# Patient Record
Sex: MALE | URBAN - METROPOLITAN AREA
[De-identification: names, ages, dates, MRNs, and addresses within clinical notes are randomized per-mention and may not be internally consistent; named-entity substitution may affect disease eponyms.]

---

## 2017-12-06 ENCOUNTER — OFFICE VISIT (OUTPATIENT)
Dept: ORTHOPEDICS | Facility: CLINIC | Age: 37
End: 2017-12-06

## 2017-12-06 VITALS — BODY MASS INDEX: 28.53 KG/M2 | HEIGHT: 76 IN | WEIGHT: 234.3 LBS

## 2017-12-06 DIAGNOSIS — M20.21 HALLUX RIGIDUS OF RIGHT FOOT: ICD-10-CM

## 2017-12-06 DIAGNOSIS — M25.572 PAIN IN JOINTS OF BOTH FEET: Primary | ICD-10-CM

## 2017-12-06 DIAGNOSIS — M25.571 PAIN IN JOINTS OF BOTH FEET: Primary | ICD-10-CM

## 2017-12-06 DIAGNOSIS — M76.72 PERONEAL TENDONITIS OF LEFT LOWER EXTREMITY: ICD-10-CM

## 2017-12-06 RX ORDER — DEXTROAMPHETAMINE SACCHARATE, AMPHETAMINE ASPARTATE, DEXTROAMPHETAMINE SULFATE AND AMPHETAMINE SULFATE 5; 5; 5; 5 MG/1; MG/1; MG/1; MG/1
20 TABLET ORAL DAILY
COMMUNITY
End: 2020-07-30

## 2017-12-06 NOTE — LETTER
12/6/2017       RE: Maico Shen  [address]  [facility] [address]     Dear Colleague,    Thank you for referring your patient, Maico Shen, to the [facility] at [facility]. Please see a copy of my visit note below.         NEW PATIENT INTAKE QUESTIONNAIRE  SPORTS & ORTHOPEDIC WALK-IN 12/6/2017    Primary Care Physician: Unsure his name    Where are the majority of your medical records? [facility]    Pt states he has chronic flare ups during long distance running or walking. Right foot toe pain and left ankle pain. Baseline pain of about 1/10, flare ups increase pain to various levels. Brace helps with the ankle pain.     Reason for Visit:    What part of your body is injured / painful?  Right big toe, left ankle    What caused the injury /pain? Overuse injury from regular activity    How long ago did your injury occur or pain begin? Several years ago    What are your most bothersome symptoms? Pain and Other: swelling or calcification of PIP joint of big toe, firm to touch.     How would you characterize your symptom? unchanged    What makes your symptoms better? Rest    What makes your symptoms worse? Other: long distance running and walking    Have you been previously seen for this problem? No    Medical History:    Medical History: mild ankle sprain 2.5 years ago    Have you had surgery on this body part before? No    Medications: adderall    Allergies: No known drug allergies    Family History of Medical Problems: osteoporosis (mother), cardiovascular issues    Previous Surgeries: None    Social History:    Occupation: Grad student     Handedness: Right    Exercise: lifting, Cardiovascular: swimming, cycling, Running Days per week: 3-4 depending on season    Review of Systems:    Have you recently had a a fever, chills, weight loss? No    Do you have any vision problems? No    Do you have any chest pain or edema? No    Do  you have any shortness of breath or wheezing?  No    Do you have stomach problems? No    Do you have any numbness or focal weakness? No    Do you have diabetes? No    Do you have problems with bleeding or clotting? No    Do you have an rashes or other skin lesions? No    Communication:    How did you hear about us? Mailing: Postcard    Who else should know about this visit? None            CHIEF COMPLAINT:  New Patient (Bilateral foot pain)       HISTORY OF PRESENT ILLNESS  Mr. Shen is a pleasant 37 year old year old male who presents to clinic today with bilateral pain of feet.  Maico explains that left lateral ankle with prolonged runs 6-7 miles.  Patient states this pain radiates to base of 5th MT.  Recalls mild ankle sprain about 2 years ago but minimal swelling and did not seek medical care.  Pain is a 1/10.  Wearing ankle brace during long runs.   No hx of formal PT.      Additional pain right great toe over the past 2 years. Stiffness at times. Pain with long distance running.  Improved with rest.  1/10 pain with intermittent flares with long distances.  No hx of inciting event, toe sprain, or injury.      Running 3-4 days a week.  Short-long runs.  No orthotics.  Long runs in stability shoes, [employer]ics.  Short runs in minimalist shoe.     Additional history: as documented    MEDICAL HISTORY      Medical History: mild ankle sprain 2.5 years ago    Have you had surgery on this body part before? No    Medications: adderall    Allergies: No known drug allergies    Family History of Medical Problems: osteoporosis (mother), cardiovascular issues    Previous Surgeries: None     Social History:    Occupation: Grad student     Handedness: Right    Exercise: lifting, Cardiovascular: swimming, cycling, Running Days per week: 3-4 depending on season    Additional medical/Social/Surgical histories reviewed in [facility] and updated as appropriate.     REVIEW OF SYSTEMS (12/6/2017)  CONSTITUTIONAL: Denies fever and weight  "loss  EYES: Denies acute vision changes  ENT: Denies hearing changes or difficulty swallowing  CARDIAC: Denies chest pain or edema  RESPIRATORY: Denies dyspnea, cough or wheeze  GASTROINTESTINAL: Denies abdominal pain, nausea, vomiting  MUSCULOSKELETAL: See HPI  SKIN: Denies any recent rash or lesion  NEUROLOGICAL: Denies numbness or focal weakness  PSYCHIATRIC: No history of psychiatric symptoms or problems  ENDOCRINE: Denies current diagnosis of diabetes  HEMATOLOGY: Denies episodes of easy bleeding      PHYSICAL EXAM  Ht 1.93 m (6' 4\")  Wt 106.3 kg (234 lb 4.8 oz)  BMI 28.52 kg/m2    General Appearance: Well appearing, alert, in no acute distress, well-hydrated, and well nourished  Cardiovascular: no signs of upper or lower extremity edema  Respiratory: no respiratory distress, no audible wheezing, no labored breathing, symmetric thoracic excursion  Psychiatric: mood and affect are appropriate, patient is oriented to time, place and person  Musculoskeletal - Bilateral feet  - stance: normal gait without limp, normal stance without excessive pronation, normal heel inversion with standing heel raise, no obvious leg length discrepancy, normal heel and toe walk. Preserved arches.  - inspection: no swelling or effusion,  normal bone and joint alignment, no obvious deformity  - palpation: Bony nodule at 1st MTP joint of right foot, however nontender at joint. Left ankle with mild tenderness of peroneal tendon.   - ROM: normal active and passive ROM of great and lesser toes of left foot. Decreased ROM of 1st MTP on right foot.  No pain with MT translation bilaterally.    - strength: 5/5 in all planes  Neuro  - no sensory or motor deficit, grossly normal coordination, normal muscle tone  Skin  - no ecchymosis, erythema, warmth, or induration, no obvious rash  Psych  - interactive, appropriate, normal mood and affect      IMAGING : XR bilateral feet. Final results and radiologist's interpretation, available in the Epic " health record. Images were reviewed with the patient/family members in the office today. My personal interpretation of the performed imaging is left foot normal; no stress rxn of 5th MT.  Right 1st MTP osteoarthritis.      ASSESSMENT & PLAN  Mr. Shen is a 37 year old year old male who presents to clinic today with left ankle and right great toe pain that is exacerbated by long distance runs.  Discussed treatment options for great toe including conservative measures, injection.  Would like to proceed with conservative therapies at this time sans injection.    Diagnosis:   Peroneal tendonitis of left foot  Hallux rigidus of right foot    -voltaren gel to peroneal tendon, 1st MTP joint  -Peroneal tendon HEP provided  -Orthotic inserts  -Avoid minimalist shoe wear  -Hendrix extension for right foot  -Follow up: PRN consider injection of 1st MTP    It was a pleasure seeing Maico today.    Imtiaz Robles DO, CAM  Primary Care Sports Medicine      Again, thank you for allowing me to participate in the care of your patient.      Sincerely,    Imtiaz Robles DO

## 2017-12-06 NOTE — PATIENT INSTRUCTIONS
1. Voltaren gel to affected to toe and peroneal tendon  2. Peroneal tendon exercises (below)  3. Hendrix Extension for creating more rigid shoe, may tape  4. Avoid minimalist shoes; stability shoe with inserts  5. Follow up as needed; consider future injection in toe joint    - wear at base of shoe under cushion      Peroneal Tendon Exercises  You may start these exercises when you can stand comfortably on your injured leg with your heel resting on the floor and your full weight evenly distributed on both legs.  Towel stretch: Sit on a hard surface with your injured leg stretched out in front of you. Loop a towel around your toes and the ball of your foot and pull the towel toward your body keeping your leg straight. Hold this position for 15 to 30 seconds and then relax. Repeat 3 times.  When you don't feel much of a stretch using the towel, you can start the following exercises.  Standing calf stretch: Stand facing a wall with your hands on the wall at about eye level. Keep your injured leg back with your heel on the floor. Keep the other leg forward with the knee bent. Turn your back foot slightly inward (as if you were pigeon-toed). Slowly lean into the wall until you feel a stretch in the back of your calf. Hold the stretch for 15 to 30 seconds. Return to the starting position. Repeat 3 times. Do this exercise several times each day.   Standing soleus stretch: Stand facing a wall with your hands on the wall at about chest height. Keep your injured leg back with your heel on the floor. Keep the other leg forward with the knee bent. Turn your back foot slightly inward (as if you were pigeon-toed). Bend your back knee slightly and gently lean into the wall until you feel a stretch in the lower calf of your injured leg. Hold the stretch for 15 to 30 seconds. Return to the starting position. Repeat 3 times.   Achilles stretch: Stand with the ball of one foot on a stair. Reach for the step below with your heel until  you feel a stretch in the arch of your foot. Hold this position for 15 to 30 seconds and then relax. Repeat 3 times.   Heel raise: Stand behind a chair or counter with both feet flat on the floor. Using the chair or counter as a support, rise up onto your toes and hold for 5 seconds. Then slowly lower yourself down without holding onto the support. (It's OK to keep holding onto the support if you need to.) When this exercise becomes less painful, try doing this exercise while you are standing on the injured leg only. Repeat 15 times. Do 2 sets of 15. Rest 30 seconds between sets.   Step-up: Stand with the foot of your injured leg on a support 3 to 5 inches (8 to 13 centimeters) high --like a small step or block of wood. Keep your other foot flat on the floor. Shift your weight onto the injured leg on the support. Straighten your injured leg as the other leg comes off the floor. Return to the starting position by bending your injured leg and slowly lowering your uninjured leg back to the floor. Do 2 sets of 15.   Resisted ankle eversion: Sit with both legs stretched out in front of you, with your feet about a shoulder's width apart. Tie a loop in one end of elastic tubing. Put the foot of your injured leg through the loop so that the tubing goes around the arch of that foot and wraps around the outside of the other foot. Hold onto the other end of the tubing with your hand to provide tension. Turn the foot of your injured leg up and out. Make sure you keep your other foot still so that it will allow the tubing to stretch as you move the foot of your injured leg. Return to the starting position. Do 2 sets of 15.   Balance and reach exercises: Stand next to a chair with your injured leg farther from the chair. The chair will provide support if you need it. Stand on the foot of your injured leg and bend your knee slightly. Try to raise the arch of this foot while keeping your big toe on the floor. Keep your foot in this  position.   With the hand that is farther away from the chair, reach forward in front of you by bending at the waist. Avoid bending your knee any more as you do this. Repeat this 15 times. To make the exercise more challenging, reach farther in front of you. Do 2 sets of 15.   While keeping your arch raised, reach the hand that is farther away from the chair across your body toward the chair. The farther you reach, the more challenging the exercise. Do 2 sets of 15.  If you have access to a wobble board, do the following exercises:  Wobble board exercises   Stand on a wobble board with your feet shoulder-width apart.   Rock the board forwards and backwards 30 times, then side to side 30 times. Hold on to a chair if you need support.   Rotate the wobble board around so that the edge of the board is in contact with the floor at all times. Do this 30 times in a clockwise and then a counterclockwise direction.   Balance on the wobble board for as long as you can without letting the edges touch the floor. Try to do this for 2 minutes without touching the floor.   Rotate the wobble board in clockwise and counterclockwise circles, but do not let the edge of the board touch the floor.   When you have mastered the wobble exercises standing on both legs, try repeating them while standing on just your injured leg. After you are able to do these exercises on one leg, try to do them with your eyes closed. Make sure you have something nearby to support you in case you lose your balance.  Developed by [employer].  Published by [employer].  Copyright  2014 [employer] and/or one of its subsidiaries. All rights reserved.

## 2017-12-06 NOTE — PROGRESS NOTES
NEW PATIENT INTAKE QUESTIONNAIRE  SPORTS & ORTHOPEDIC WALK-IN 12/6/2017    Primary Care Physician: Unsure his name    Where are the majority of your medical records? [facility]    Pt states he has chronic flare ups during long distance running or walking. Right foot toe pain and left ankle pain. Baseline pain of about 1/10, flare ups increase pain to various levels. Brace helps with the ankle pain.     Reason for Visit:    What part of your body is injured / painful?  Right big toe, left ankle    What caused the injury /pain? Overuse injury from regular activity    How long ago did your injury occur or pain begin? Several years ago    What are your most bothersome symptoms? Pain and Other: swelling or calcification of PIP joint of big toe, firm to touch.     How would you characterize your symptom? unchanged    What makes your symptoms better? Rest    What makes your symptoms worse? Other: long distance running and walking    Have you been previously seen for this problem? No    Medical History:    Medical History: mild ankle sprain 2.5 years ago    Have you had surgery on this body part before? No    Medications: adderall    Allergies: No known drug allergies    Family History of Medical Problems: osteoporosis (mother), cardiovascular issues    Previous Surgeries: None    Social History:    Occupation: Grad student     Handedness: Right    Exercise: lifting, Cardiovascular: swimming, cycling, Running Days per week: 3-4 depending on season    Review of Systems:    Have you recently had a a fever, chills, weight loss? No    Do you have any vision problems? No    Do you have any chest pain or edema? No    Do you have any shortness of breath or wheezing?  No    Do you have stomach problems? No    Do you have any numbness or focal weakness? No    Do you have diabetes? No    Do you have problems with bleeding or clotting? No    Do you have an rashes or other skin lesions? No    Communication:    How did you hear about  us? Mailing: Postcard    Who else should know about this visit? None

## 2017-12-06 NOTE — LETTER
Date:December 7, 2017      Patient was self referred, no letter generated. Do not send.        [facility] Health Information

## 2017-12-06 NOTE — MR AVS SNAPSHOT
After Visit Summary   12/6/2017    Maico Shen    MRN: [id number]           Patient Information     Date Of Birth          1980        Visit Information        Provider Department      12/6/2017 9:50 AM Imtiaz Robles DO [facility]        Today's Diagnoses     Pain in joints of both feet    -  1      Care Instructions    1. Voltaren gel to affected to toe and peroneal tendon  2. Peroneal tendon exercises (below)  3. Hendrix Extension for creating more rigid shoe, may tape  4. Avoid minimalist shoes; stability shoe with inserts  5. Follow up as needed; consider future injection in toe joint    - wear at base of shoe under cushion      Peroneal Tendon Exercises  You may start these exercises when you can stand comfortably on your injured leg with your heel resting on the floor and your full weight evenly distributed on both legs.  Towel stretch: Sit on a hard surface with your injured leg stretched out in front of you. Loop a towel around your toes and the ball of your foot and pull the towel toward your body keeping your leg straight. Hold this position for 15 to 30 seconds and then relax. Repeat 3 times.  When you don't feel much of a stretch using the towel, you can start the following exercises.  Standing calf stretch: Stand facing a wall with your hands on the wall at about eye level. Keep your injured leg back with your heel on the floor. Keep the other leg forward with the knee bent. Turn your back foot slightly inward (as if you were pigeon-toed). Slowly lean into the wall until you feel a stretch in the back of your calf. Hold the stretch for 15 to 30 seconds. Return to the starting position. Repeat 3 times. Do this exercise several times each day.   Standing soleus stretch: Stand facing a wall with your hands on the wall at about chest height. Keep your injured leg back with your heel on the floor. Keep the other leg forward with the knee bent. Turn  your back foot slightly inward (as if you were pigeon-toed). Bend your back knee slightly and gently lean into the wall until you feel a stretch in the lower calf of your injured leg. Hold the stretch for 15 to 30 seconds. Return to the starting position. Repeat 3 times.   Achilles stretch: Stand with the ball of one foot on a stair. Reach for the step below with your heel until you feel a stretch in the arch of your foot. Hold this position for 15 to 30 seconds and then relax. Repeat 3 times.   Heel raise: Stand behind a chair or counter with both feet flat on the floor. Using the chair or counter as a support, rise up onto your toes and hold for 5 seconds. Then slowly lower yourself down without holding onto the support. (It's OK to keep holding onto the support if you need to.) When this exercise becomes less painful, try doing this exercise while you are standing on the injured leg only. Repeat 15 times. Do 2 sets of 15. Rest 30 seconds between sets.   Step-up: Stand with the foot of your injured leg on a support 3 to 5 inches (8 to 13 centimeters) high --like a small step or block of wood. Keep your other foot flat on the floor. Shift your weight onto the injured leg on the support. Straighten your injured leg as the other leg comes off the floor. Return to the starting position by bending your injured leg and slowly lowering your uninjured leg back to the floor. Do 2 sets of 15.   Resisted ankle eversion: Sit with both legs stretched out in front of you, with your feet about a shoulder's width apart. Tie a loop in one end of elastic tubing. Put the foot of your injured leg through the loop so that the tubing goes around the arch of that foot and wraps around the outside of the other foot. Hold onto the other end of the tubing with your hand to provide tension. Turn the foot of your injured leg up and out. Make sure you keep your other foot still so that it will allow the tubing to stretch as you move the foot  of your injured leg. Return to the starting position. Do 2 sets of 15.   Balance and reach exercises: Stand next to a chair with your injured leg farther from the chair. The chair will provide support if you need it. Stand on the foot of your injured leg and bend your knee slightly. Try to raise the arch of this foot while keeping your big toe on the floor. Keep your foot in this position.   With the hand that is farther away from the chair, reach forward in front of you by bending at the waist. Avoid bending your knee any more as you do this. Repeat this 15 times. To make the exercise more challenging, reach farther in front of you. Do 2 sets of 15.   While keeping your arch raised, reach the hand that is farther away from the chair across your body toward the chair. The farther you reach, the more challenging the exercise. Do 2 sets of 15.  If you have access to a wobble board, do the following exercises:  Wobble board exercises   Stand on a wobble board with your feet shoulder-width apart.   Rock the board forwards and backwards 30 times, then side to side 30 times. Hold on to a chair if you need support.   Rotate the wobble board around so that the edge of the board is in contact with the floor at all times. Do this 30 times in a clockwise and then a counterclockwise direction.   Balance on the wobble board for as long as you can without letting the edges touch the floor. Try to do this for 2 minutes without touching the floor.   Rotate the wobble board in clockwise and counterclockwise circles, but do not let the edge of the board touch the floor.   When you have mastered the wobble exercises standing on both legs, try repeating them while standing on just your injured leg. After you are able to do these exercises on one leg, try to do them with your eyes closed. Make sure you have something nearby to support you in case you lose your balance.  Developed by [employer].  Published by [employer].  Copyright  " 2014 [employer] and/or one of its subsidiaries. All rights reserved.                Follow-ups after your visit        Who to contact     Please call your clinic at [phone] to:    Ask questions about your health    Make or cancel appointments    Discuss your medicines    Learn about your test results    Speak to your doctor   If you have compliments or concerns about an experience at your clinic, or if you wish to file a complaint, please contact [facility] Patient Relations at [phone] or email us at [email or web address]         Additional Information About Your Visit        [employer] Information     WOMN is an electronic gateway that provides easy, online access to your medical records. With WOMN, you can request a clinic appointment, read your test results, renew a prescription or communicate with your care team.     To sign up for WOMN visit the website at [email or web address]   You will be asked to enter the access code listed below, as well as some personal information. Please follow the directions to create your username and password.     Your access code is: [email or web address]  Expires: 3/6/2018 10:40 AM     Your access code will  in 90 days. If you need help or a new code, please contact your [facility] or call [phone] for assistance.        Care EveryWhere ID     This is your Care EveryWhere ID. This could be used by other organizations to access your [facility] medical records  [id number]        Your Vitals Were     Height BMI (Body Mass Index)                1.93 m (6' 4\") 28.52 kg/m2           Blood Pressure from Last 3 Encounters:   No data found for BP    Weight from Last 3 Encounters:   17 106.3 kg (234 lb 4.8 oz)               Primary Care Provider    None Specified       No primary provider on file.        Equal Access to Services     TAMARA GILLILAND AH: radha Ramsay " crispin ruizchrisbarbara espinozacassie moore. So [facility] [phone].    ATENCIÓN: Si angélica weller, tiene a miller disposición servicios gratuitos de asistencia lingüística. Llame al [phone].    We comply with applicable federal civil rights laws and [place] laws. We do not discriminate on the basis of race, color, national origin, age, disability, sex, sexual orientation, or gender identity.            Thank you!     Thank you for choosing [facility] CLIN[facility]  for your care. Our goal is always to provide you with excellent care. Hearing back from our patients is one way we can continue to improve our services. Please take a few minutes to complete the written survey that you may receive in the mail after your visit with us. Thank you!             Your Updated Medication List - Protect others around you: Learn how to safely use, store and throw away your medicines at www.disposemymeds.org.          This list is accurate as of: 12/6/17 10:53 AM.  Always use your most recent med list.                   Brand Name Dispense Instructions for use Diagnosis    ADDERALL 20 MG per tablet   Generic drug:  amphetamine-dextroamphetamine      Take 20 mg by mouth daily

## 2017-12-06 NOTE — PROGRESS NOTES
CHIEF COMPLAINT:  New Patient (Bilateral foot pain)       HISTORY OF PRESENT ILLNESS  Mr. Shen is a pleasant 37 year old year old male who presents to clinic today with bilateral pain of feet.  Maico explains that left lateral ankle with prolonged runs 6-7 miles.  Patient states this pain radiates to base of 5th MT.  Recalls mild ankle sprain about 2 years ago but minimal swelling and did not seek medical care.  Pain is a 1/10.  Wearing ankle brace during long runs.   No hx of formal PT.      Additional pain right great toe over the past 2 years. Stiffness at times. Pain with long distance running.  Improved with rest.  1/10 pain with intermittent flares with long distances.  No hx of inciting event, toe sprain, or injury.      Running 3-4 days a week.  Short-long runs.  No orthotics.  Long runs in stability shoes, [employer]ics.  Short runs in minimalist shoe.     Additional history: as documented    MEDICAL HISTORY      Medical History: mild ankle sprain 2.5 years ago    Have you had surgery on this body part before? No    Medications: adderall    Allergies: No known drug allergies    Family History of Medical Problems: osteoporosis (mother), cardiovascular issues    Previous Surgeries: None     Social History:    Occupation: Grad student     Handedness: Right    Exercise: lifting, Cardiovascular: swimming, cycling, Running Days per week: 3-4 depending on season    Additional medical/Social/Surgical histories reviewed in [facility] and updated as appropriate.     REVIEW OF SYSTEMS (12/6/2017)  CONSTITUTIONAL: Denies fever and weight loss  EYES: Denies acute vision changes  ENT: Denies hearing changes or difficulty swallowing  CARDIAC: Denies chest pain or edema  RESPIRATORY: Denies dyspnea, cough or wheeze  GASTROINTESTINAL: Denies abdominal pain, nausea, vomiting  MUSCULOSKELETAL: See HPI  SKIN: Denies any recent rash or lesion  NEUROLOGICAL: Denies numbness or focal weakness  PSYCHIATRIC: No history of psychiatric  "symptoms or problems  ENDOCRINE: Denies current diagnosis of diabetes  HEMATOLOGY: Denies episodes of easy bleeding      PHYSICAL EXAM  Ht 1.93 m (6' 4\")  Wt 106.3 kg (234 lb 4.8 oz)  BMI 28.52 kg/m2    General Appearance: Well appearing, alert, in no acute distress, well-hydrated, and well nourished  Cardiovascular: no signs of upper or lower extremity edema  Respiratory: no respiratory distress, no audible wheezing, no labored breathing, symmetric thoracic excursion  Psychiatric: mood and affect are appropriate, patient is oriented to time, place and person  Musculoskeletal - Bilateral feet  - stance: normal gait without limp, normal stance without excessive pronation, normal heel inversion with standing heel raise, no obvious leg length discrepancy, normal heel and toe walk. Preserved arches.  - inspection: no swelling or effusion,  normal bone and joint alignment, no obvious deformity  - palpation: Bony nodule at 1st MTP joint of right foot, however nontender at joint. Left ankle with mild tenderness of peroneal tendon.   - ROM: normal active and passive ROM of great and lesser toes of left foot. Decreased ROM of 1st MTP on right foot.  No pain with MT translation bilaterally.    - strength: 5/5 in all planes  Neuro  - no sensory or motor deficit, grossly normal coordination, normal muscle tone  Skin  - no ecchymosis, erythema, warmth, or induration, no obvious rash  Psych  - interactive, appropriate, normal mood and affect      IMAGING : XR bilateral feet. Final results and radiologist's interpretation, available in the [facility] health record. Images were reviewed with the patient/family members in the office today. My personal interpretation of the performed imaging is left foot normal; no stress rxn of 5th MT.  Right 1st MTP osteoarthritis.      ASSESSMENT & PLAN  Mr. Shen is a 37 year old year old male who presents to clinic today with left ankle and right great toe pain that is exacerbated by long " distance runs.  Discussed treatment options for great toe including conservative measures, injection.  Would like to proceed with conservative therapies at this time sans injection.    Diagnosis:   Peroneal tendonitis of left foot  Hallux rigidus of right foot    -voltaren gel to peroneal tendon, 1st MTP joint  -Peroneal tendon HEP provided  -Orthotic inserts  -Avoid minimalist shoe wear  -Hendrix extension for right foot  -Follow up: PRN consider injection of 1st MTP    It was a pleasure seeing Maico today.    Imtiaz Robles DO, CAQSM  Primary Care Sports Medicine

## 2018-04-25 ENCOUNTER — OFFICE VISIT (OUTPATIENT)
Dept: URGENT CARE | Facility: URGENT CARE | Age: 38
End: 2018-04-25
Payer: COMMERCIAL

## 2018-04-25 VITALS
DIASTOLIC BLOOD PRESSURE: 79 MMHG | WEIGHT: 231.31 LBS | SYSTOLIC BLOOD PRESSURE: 139 MMHG | TEMPERATURE: 98.2 F | BODY MASS INDEX: 28.16 KG/M2 | HEART RATE: 71 BPM

## 2018-04-25 DIAGNOSIS — S99.922A FOOT INJURY, LEFT, INITIAL ENCOUNTER: Primary | ICD-10-CM

## 2018-04-25 PROCEDURE — 99203 OFFICE O/P NEW LOW 30 MIN: CPT | Performed by: PHYSICIAN ASSISTANT

## 2018-04-25 NOTE — MR AVS SNAPSHOT
"              After Visit Summary   4/25/2018    Maico Shen    MRN: [id number]           Patient Information     Date Of Birth          1980        Visit Information        Provider Department      4/25/2018 4:50 PM Es Thompson PA-C [facility]        Today's Diagnoses     Foot injury, left, initial encounter    -  1      Care Instructions    (S99.922A) Foot injury, left, initial encounter  (primary encounter diagnosis)  Comment: possible stress fracture versus tendon injury  Plan: order for DME, diclofenac (VOLTAREN) 1 % GEL         topical gel          Ice to area as needed over thin cloth    REST.  No running or weight bearing training until follow up with sports med clinic.                  Follow-ups after your visit        Who to contact     If you have questions or need follow up information about today's clinic visit or your schedule please contact [facility] directly at [phone].  Normal or non-critical lab and imaging results will be communicated to you by MyChart, letter or phone within 4 business days after the clinic has received the results. If you do not hear from us within 7 days, please contact the clinic through [employer] or phone. If you have a critical or abnormal lab result, we will notify you by phone as soon as possible.  Submit refill requests through [employer] or call your pharmacy and they will forward the refill request to us. Please allow 3 business days for your refill to be completed.          Additional Information About Your Visit        [employer] Information     [employer] lets you send messages to your doctor, view your test results, renew your prescriptions, schedule appointments and more. To sign up, go to [email or web address] . Click on \"Log in\" on the left side of the screen, which will take you to the Welcome page. Then click on \"Sign up Now\" on the right side of the page.     You will be asked to " enter the access code listed below, as well as some personal information. Please follow the directions to create your username and password.     Your access code is: [email or web address]  Expires: 2018  5:44 PM     Your access code will  in 90 days. If you need help or a new code, please call your [place] clinic or [phone].        Care EveryWhere ID     This is your Care EveryWhere ID. This could be used by other organizations to access your [place] medical records  [id number]        Your Vitals Were     Pulse Temperature BMI (Body Mass Index)             71 98.2  F (36.8  C) (Oral) 28.16 kg/m2          Blood Pressure from Last 3 Encounters:   18 139/79    Weight from Last 3 Encounters:   18 231 lb 5 oz (104.9 kg)   17 234 lb 4.8 oz (106.3 kg)              Today, you had the following     No orders found for display         Today's Medication Changes          These changes are accurate as of 18  5:44 PM.  If you have any questions, ask your nurse or doctor.               Start taking these medicines.        Dose/Directions    diclofenac 1 % Gel topical gel   Commonly known as:  VOLTAREN   Used for:  Foot injury, left, initial encounter   Started by:  Es Thompson PA-C        Apply 4 grams to knees or 2 grams to hands four times daily using enclosed dosing card.   Quantity:  100 g   Refills:  1       order for DME   Used for:  Foot injury, left, initial encounter   Started by:  Es Thompson PA-C        Equipment being ordered: short cam walker   Quantity:  1 Device   Refills:  0            Where to get your medicines      These medications were sent to [facility] [id number] [place] [address] AT [address] [address]     Phone:  [phone]     diclofenac 1 % Gel topical gel         Some of these will need a paper prescription and others can be bought over the counter.  Ask your  nurse if you have questions.     Bring a paper prescription for each of these medications     order for DME                Primary Care Provider Fax #    Physician No Ref-Primary [phone]       No address on file        Equal Access to Services     TAMARA [place] : Hadjeffery aad ku hadstacy Sibley, radha lushellie, crispin kathania espino, cassie daley laArjunlucinda mejia. So [facility] [phone].    ATENCIÓN: Si habla español, tiene a miller disposición servicios gratuitos de asistencia lingüística. Llame al [phone].    We comply with applicable federal civil rights laws and [place] laws. We do not discriminate on the basis of race, color, national origin, age, disability, sex, sexual orientation, or gender identity.            Thank you!     Thank you for choosing [facility]  for your care. Our goal is always to provide you with excellent care. Hearing back from our patients is one way we can continue to improve our services. Please take a few minutes to complete the written survey that you may receive in the mail after your visit with us. Thank you!             Your Updated Medication List - Protect others around you: Learn how to safely use, store and throw away your medicines at www.disposemymeds.org.          This list is accurate as of 4/25/18  5:44 PM.  Always use your most recent med list.                   Brand Name Dispense Instructions for use Diagnosis    ADDERALL 20 MG per tablet   Generic drug:  amphetamine-dextroamphetamine      Take 20 mg by mouth daily        diclofenac 1 % Gel topical gel    VOLTAREN    100 g    Apply 4 grams to knees or 2 grams to hands four times daily using enclosed dosing card.    Foot injury, left, initial encounter       order for DME     1 Device    Equipment being ordered: short cam walker    Foot injury, left, initial encounter

## 2018-04-25 NOTE — PATIENT INSTRUCTIONS
(F32.029F) Foot injury, left, initial encounter  (primary encounter diagnosis)  Comment: possible stress fracture versus tendon injury  Plan: order for DME, diclofenac (VOLTAREN) 1 % GEL         topical gel          Ice to area as needed over thin cloth    REST.  No running or weight bearing training until follow up with sports med clinic.

## 2019-04-24 ENCOUNTER — MEDICAL CORRESPONDENCE (OUTPATIENT)
Dept: HEALTH INFORMATION MANAGEMENT | Facility: CLINIC | Age: 39
End: 2019-04-24

## 2019-04-24 ENCOUNTER — TRANSFERRED RECORDS (OUTPATIENT)
Dept: HEALTH INFORMATION MANAGEMENT | Facility: CLINIC | Age: 39
End: 2019-04-24

## 2019-04-25 ENCOUNTER — OFFICE VISIT (OUTPATIENT)
Dept: ORTHOPEDICS | Facility: CLINIC | Age: 39
End: 2019-04-25
Payer: COMMERCIAL

## 2019-04-25 VITALS — SYSTOLIC BLOOD PRESSURE: 154 MMHG | DIASTOLIC BLOOD PRESSURE: 89 MMHG

## 2019-04-25 DIAGNOSIS — M76.61 ACHILLES TENDONITIS, BILATERAL: Primary | ICD-10-CM

## 2019-04-25 DIAGNOSIS — M76.62 ACHILLES TENDONITIS, BILATERAL: Primary | ICD-10-CM

## 2019-04-25 NOTE — LETTER
4/25/2019       RE: Maico Shen  [address]  [facility] [address]     Dear Colleague,    Thank you for referring your patient, Maico Shen, to the [facility] at [facility]. Please see a copy of my visit note below.          CHIEF COMPLAINT:  Pain of the Right Ankle and Trauma (bilat achilles pain/ swelling 4-24-19)       HISTORY OF PRESENT ILLNESS  Mr. Shen is a pleasant 38 year old year old male who presents to clinic today with pain of bilateral posterior achilles / posterior ankles.  Patient is a runner by nature and is taken the winter and early spring off.  He began returning to his running over the past week or so and began noticing swelling at the posterior region of his lower Achilles tendon area.  But he was concerned that he may have ruptured his Achilles tendons and wanted reassurance from us today.  He notes pain only in the left Achilles region and not on the right, however notes the right is actually more swollen than the left.  Is able to ambulate with mild discomfort and he has been able to run through this pain.    Onset: rapid  Location: bilateral ankle  Quality:  Aching left only  Duration: 2 days   Severity: mild at worst  Timing: ambulation, intermittent  Modifying factors:  resting and non-use makes it better, movement and use makes it worse  Associated signs & symptoms: swelling  Previous similar pain: Yes  Treatments to date: Ice    Additional history: as documented    MEDICAL HISTORY  There is no problem list on file for this patient.      Current Outpatient Medications   Medication Sig Dispense Refill     amphetamine-dextroamphetamine (ADDERALL) 20 MG per tablet Take 20 mg by mouth daily       diclofenac (VOLTAREN) 1 % GEL topical gel Apply 4 grams to knees or 2 grams to hands four times daily using enclosed dosing card. 100 g 1     order for DME Equipment being ordered: short cam walker 1  Device 0       No Known Allergies    No family history on file.    Additional medical/Social/Surgical histories reviewed in [facility] and updated as appropriate.     REVIEW OF SYSTEMS (4/26/2019)  CONSTITUTIONAL: Denies fever and weight loss  EYES: Denies acute vision changes  ENT: Denies hearing changes or difficulty swallowing  CARDIAC: Denies chest pain or edema  RESPIRATORY: Denies dyspnea, cough or wheeze  GASTROINTESTINAL: Denies abdominal pain, nausea, vomiting  MUSCULOSKELETAL: See HPI  SKIN: Denies any recent rash or lesion  NEUROLOGICAL: Denies numbness or focal weakness  PSYCHIATRIC: No history of psychiatric symptoms or problems  ENDOCRINE: Diagnosis of diabetes:No  HEMATOLOGY: Denies episodes of easy bleeding      PHYSICAL EXAM  /89 (BP Location: Right arm, Patient Position: Sitting, Cuff Size: Adult Regular)     General  - normal appearance, in no obvious distress  CV  - normal pulses at posterior tib and dorsalis pedis  Pulm  - normal respiratory pattern, non-labored  Musculoskeletal - foot / ankle bilateral  - stance: normal gait without limp, normal stance without excessive pronation, normal heel inversion with standing heel raise, no obvious leg length discrepancy, normal heel and toe walk  - inspection: Thickened mid-substance Achilles tendons bilaterally,  normal bone and joint alignment, no obvious deformity  - palpation: tender over the medial terminal insertion of the Achilles on left.  Palpable swelling of achilles regions bilaterally.  No erythema or blistering.  - ROM: Tightness and tenderness on left only with passive dorsiflexion, normal plantar flexion, inversion, and eversion bilaterally  - strength: 5/5 in all planes  Neuro  - no sensory or motor deficit, grossly normal coordination, normal muscle tone  Skin  - no ecchymosis, erythema, warmth, or induration, no obvious rash  Psych  - interactive, appropriate, normal mood and affect    IMAGING :   Limited in-office U/S performed today.   Using linear high-frequency transducer, the posterior Achilles region was surveyed.   the limited study revealed bilateral thickening of Achilles tendons just proximal to the insertion site.  Evidence of tendinopathy is seen by heterogeneous appearance and disruption of truly fibular pattern in the region of thickening.  No discrete tearing or disruption of the fibers are seen in long axis as well as short axis views.     ASSESSMENT & PLAN  Mr. Shen is a 38 year old year old male who presents to clinic today with bilateral Achilles tendinitis secondary to returning to running program in the spring.    We discussed treatment options including icing, activity modification/crosstraining, using heel lifts in the interim.  At this time patient will begin ibuprofen 3 times daily for the next week then utilize voltaren gel.  He will start a home exercise program with a strong emphasis on eccentric strengthening of the Achilles tendons.  He will also refrain from running over the next 10 days and gradually return as swelling and pain has completely subsided.  He does use a 0 drop shoe and I encouraged him to try a shoe with a higher ramp angle.    It was a pleasure seeing Maico today.    Imtiaz Robles DO, [facility]  Primary Care Sports Medicine      Again, thank you for allowing me to participate in the care of your patient.      Sincerely,    Imtiaz Robles DO

## 2019-04-25 NOTE — PATIENT INSTRUCTIONS
WHAT IS AN ACHILLES TENDON INJURY?      An Achilles tendon injury is a problem with the tendon that connects your heel bone to the calf muscle of your lower leg. Tendons are strong bands of tissue that attach muscle to bone. You use the Achilles tendon when you point your toes up and down and when you walk, run, or jump.    Tendons can be injured suddenly or they may be slowly damaged over time. You can have tiny or partial tears in your tendon. If you have a complete tear of your tendon, it s called a rupture. Other tendon injuries may be called a strain, tendinosis, or tendonitis.    WHAT IS THE CAUSE?    Achilles tendon injuries can be caused by:    Overuse of the tendon, such as from lots of uphill running, intense exercise, or sports training or from doing a lot of work that causes you to bend at the knees and ankles  A sudden activity that twists or tears your tendon, such as jumping, starting to sprint, or falling    You are more likely to have an Achilles tendon problem if you:    Have tight calf muscles or a tight Achilles tendon  Change the type of running shoes you wear, or if you wear high heels most of the day and then switch to lower heeled shoes for exercise  Have a problem called over-pronation, which happens when your feet roll inward and flatten out more than normal when you walk or run  WHAT ARE THE SYMPTOMS?    Symptoms may include:    Pain, stiffness, weakness, or swelling in the back of your lower leg  Pain in the back of your leg or ankle when you rise up on your toes  Trouble moving your ankle in different directions  If the tendon is completely torn, you may have felt a pop at the time of the injury. You may not be able to lift your heel off the ground or point your toes.    HOW IS IT DIAGNOSED?    Your healthcare provider will ask about your symptoms, activities, and medical history and examine you. Your provider may ask to watch you walk or run to see if your feet flatten more than normal.  You may have tests such as X-rays or other scans.    HOW IS IT TREATED?    You will need to change or stop doing the activities that cause pain until your tendon has healed. For example, you may need to swim instead of run.    Your healthcare provider may recommend stretching and strengthening exercises to help you heal.    Special shoes or shoe inserts may help. If you have a severe injury, your healthcare provider may put your foot in a cast or splint for several weeks to keep it from moving while it heals.    If your tendon is torn, you may need surgery to repair the tendon.    The pain often gets better within a few weeks with self-care, but some injuries may take several months or longer to heal. It s important to follow all of your healthcare provider s instructions.    HOW CAN I TAKE CARE OF MYSELF?    To keep swelling down and help relieve pain:    Put an ice pack, gel pack, or package of frozen vegetables wrapped in a cloth on the injured area every 3 to 4 hours for up to 20 minutes at a time.  Do ice massage. To do this, freeze water in a Styrofoam cup, then peel the top of the cup away to expose the ice. Hold the bottom of the cup and rub the ice over the painful area for 5 to 10 minutes. Do this several times a day while you have pain.  Keep your foot up on pillows when you sit or lie down.    Take nonprescription pain medicine, such as acetaminophen, ibuprofen, or naproxen. Nonsteroidal anti-inflammatory medicines (NSAIDs), such as ibuprofen and naproxen, may cause stomach bleeding and other problems. These risks increase with age. Read the label and take as directed. Unless recommended by your healthcare provider, you should not take this medicine for more than 10 days.    Moist heat may help relieve pain, relax your muscles, and make it easier to use your ankle. Put moist heat on the injured area for 10 to 15 minutes before you do warm-up and stretching exercises. Moist heat includes heat patches or  moist heating pads that you can buy at most drugstores, a warm, wet washcloth, or a hot shower. To prevent burns to your skin, follow directions on the package and do not lie on any type of hot pad. Don t use heat if you have swelling.    HOW CAN I HELP PREVENT AN ACHILLES TENDON PROBLEM?     Warm-up exercises and stretching before activities can help prevent injuries. If you have tight Achilles tendons or calf muscles, stretch them twice a day whether or not you are doing any activities that day. If your leg or ankle hurts after exercise, putting ice on it may help keep it from getting injured.    Avoid running uphill if you tend to have Achilles tendon injuries.    Follow the safety rules and use the protective equipment recommended for your work or sport.    Achilles Tendonitis Exercises    You can do the towel stretch right away. When the towel stretch is easy, try the standing calf stretch, soleus stretch, and leg lift. When you no longer have sharp pain in your calf or tendon, you can do the step-up, heel raises, and static and balance and reach exercises.    Towel stretch: Sit on a hard surface with your injured leg stretched out in front of you. Loop a towel around your toes and the ball of your foot and pull the towel toward your body keeping your leg straight. Hold this position for 15 to 30 seconds and then relax. Repeat 3 times.    Standing calf stretch: Stand facing a wall with your hands on the wall at about eye level. Keep your injured leg back with your heel on the floor. Keep the other leg forward with the knee bent. Turn your back foot slightly inward (as if you were pigeon-toed). Slowly lean into the wall until you feel a stretch in the back of your calf. Hold the stretch for 15 to 30 seconds. Return to the starting position. Repeat 3 times. Do this exercise several times each day.    Standing soleus stretch: Stand facing a wall with your hands on the wall at about chest height. Keep your injured  leg back with your heel on the floor. Keep the other leg forward with the knee bent. Turn your back foot slightly inward (as if you were pigeon-toed). Bend your back knee slightly and gently lean into the wall until you feel a stretch in the lower calf of your injured leg. Hold the stretch for 15 to 30 seconds. Return to the starting position. Repeat 3 times.    Side-lying leg lift: Lie on your uninjured side. Tighten the front thigh muscles on your injured leg and lift that leg 8 to 10 inches (20 to 25 centimeters) away from the other leg. Keep the leg straight and lower it slowly. Do 2 sets of 15.    Step-up: Stand with the foot of your injured leg on a support 3 to 5 inches (8 to 13 centimeters) high --like a small step or block of wood. Keep your other foot flat on the floor. Shift your weight onto the injured leg on the support. Straighten your injured leg as the other leg comes off the floor. Return to the starting position by bending your injured leg and slowly lowering your uninjured leg back to the floor. Do 2 sets of 15.    Eccentric calf strengthening: Stand behind a chair or counter with your feet flat on the floor. Using the chair or counter as a support to help you, raise your body up onto your toes and hold for 5 seconds. Then slowly lower yourself down with your injured leg only. (It's OK to keep holding onto the support if you need to.) Repeat 15 times. Do 2 sets of 15. Rest 30 seconds between sets.    Single leg balance exercises: Stand next to a chair with your injured leg farther from the chair. The chair will provide support if you need it. Stand on the foot of your injured leg and bend your knee slightly. Try to raise the arch of this foot while keeping your big toe on the floor. Keep your foot in this position.    While keeping your arch raised, reach the hand that is farther away from the chair across your body toward the chair. The farther you reach, the more challenging the exercise. Do 2  sets of 15.    Published by [employer].  Copyright  2014 [employer] and/or one of its subsidiaries. All rights reserved.

## 2019-04-25 NOTE — LETTER
Date:April 29, 2019      Patient was self referred, no letter generated. Do not send.        [facility]

## 2019-04-26 NOTE — PROGRESS NOTES
CHIEF COMPLAINT:  Pain of the Right Ankle and Trauma (bilat achilles pain/ swelling 4-24-19)       HISTORY OF PRESENT ILLNESS  Mr. Shen is a pleasant 38 year old year old male who presents to clinic today with pain of bilateral posterior achilles / posterior ankles.  Patient is a runner by nature and is taken the winter and early spring off.  He began returning to his running over the past week or so and began noticing swelling at the posterior region of his lower Achilles tendon area.  But he was concerned that he may have ruptured his Achilles tendons and wanted reassurance from us today.  He notes pain only in the left Achilles region and not on the right, however notes the right is actually more swollen than the left.  Is able to ambulate with mild discomfort and he has been able to run through this pain.    Onset: rapid  Location: bilateral ankle  Quality:  Aching left only  Duration: 2 days   Severity: mild at worst  Timing: ambulation, intermittent  Modifying factors:  resting and non-use makes it better, movement and use makes it worse  Associated signs & symptoms: swelling  Previous similar pain: Yes  Treatments to date: Ice    Additional history: as documented    MEDICAL HISTORY  There is no problem list on file for this patient.      Current Outpatient Medications   Medication Sig Dispense Refill     amphetamine-dextroamphetamine (ADDERALL) 20 MG per tablet Take 20 mg by mouth daily       diclofenac (VOLTAREN) 1 % GEL topical gel Apply 4 grams to knees or 2 grams to hands four times daily using enclosed dosing card. 100 g 1     order for DME Equipment being ordered: short cam walker 1 Device 0       No Known Allergies    No family history on file.    Additional medical/Social/Surgical histories reviewed in [facility] and updated as appropriate.     REVIEW OF SYSTEMS (4/26/2019)  CONSTITUTIONAL: Denies fever and weight loss  EYES: Denies acute vision changes  ENT: Denies hearing changes or difficulty  swallowing  CARDIAC: Denies chest pain or edema  RESPIRATORY: Denies dyspnea, cough or wheeze  GASTROINTESTINAL: Denies abdominal pain, nausea, vomiting  MUSCULOSKELETAL: See HPI  SKIN: Denies any recent rash or lesion  NEUROLOGICAL: Denies numbness or focal weakness  PSYCHIATRIC: No history of psychiatric symptoms or problems  ENDOCRINE: Diagnosis of diabetes:No  HEMATOLOGY: Denies episodes of easy bleeding      PHYSICAL EXAM  /89 (BP Location: Right arm, Patient Position: Sitting, Cuff Size: Adult Regular)     General  - normal appearance, in no obvious distress  CV  - normal pulses at posterior tib and dorsalis pedis  Pulm  - normal respiratory pattern, non-labored  Musculoskeletal - foot / ankle bilateral  - stance: normal gait without limp, normal stance without excessive pronation, normal heel inversion with standing heel raise, no obvious leg length discrepancy, normal heel and toe walk  - inspection: Thickened mid-substance Achilles tendons bilaterally,  normal bone and joint alignment, no obvious deformity  - palpation: tender over the medial terminal insertion of the Achilles on left.  Palpable swelling of achilles regions bilaterally.  No erythema or blistering.  - ROM: Tightness and tenderness on left only with passive dorsiflexion, normal plantar flexion, inversion, and eversion bilaterally  - strength: 5/5 in all planes  Neuro  - no sensory or motor deficit, grossly normal coordination, normal muscle tone  Skin  - no ecchymosis, erythema, warmth, or induration, no obvious rash  Psych  - interactive, appropriate, normal mood and affect    IMAGING :   Limited in-office U/S performed today.  Using linear high-frequency transducer, the posterior Achilles region was surveyed.   the limited study revealed bilateral thickening of Achilles tendons just proximal to the insertion site.  Evidence of tendinopathy is seen by heterogeneous appearance and disruption of truly fibular pattern in the region of  thickening.  No discrete tearing or disruption of the fibers are seen in long axis as well as short axis views.     ASSESSMENT & PLAN  Mr. Shen is a 38 year old year old male who presents to clinic today with bilateral Achilles tendinitis secondary to returning to running program in the spring.    We discussed treatment options including icing, activity modification/crosstraining, using heel lifts in the interim.  At this time patient will begin ibuprofen 3 times daily for the next week then utilize voltaren gel.  He will start a home exercise program with a strong emphasis on eccentric strengthening of the Achilles tendons.  He will also refrain from running over the next 10 days and gradually return as swelling and pain has completely subsided.  He does use a 0 drop shoe and I encouraged him to try a shoe with a higher ramp angle.    It was a pleasure seeing Maico today.    Imtiaz Robles DO, [facility]  Primary Care Sports Medicine

## 2019-05-13 ENCOUNTER — PRE VISIT (OUTPATIENT)
Dept: SURGERY | Facility: CLINIC | Age: 39
End: 2019-05-13

## 2019-05-13 NOTE — TELEPHONE ENCOUNTER
FUTURE VISIT INFORMATION      FUTURE VISIT INFORMATION:    Date: 5/29/19    Time: 10:00am    Location: [facility]  REFERRAL INFORMATION:    Referring provider:  Nikos Buckley    Referring providers clinic:  [facility]  Reason for visit/diagnosis  Hemangioma on eyelid   RECORDS REQUESTED FROM:       Clinic name Comments Records Status Imaging Status   [facility] Request for records sent 5/13

## 2019-05-24 ENCOUNTER — TELEPHONE (OUTPATIENT)
Dept: PLASTIC SURGERY | Facility: CLINIC | Age: 39
End: 2019-05-24

## 2019-05-24 NOTE — TELEPHONE ENCOUNTER
Pre Visit Call and Assessment    Date of call:  05/24/2019    Phone numbers:  Home number on file [phone] (home)    Reached patient/confirmed appointment:  No - left message:   on voicemail  Patient care team/Primary provider:  No Ref-Primary, Physician  Preferred outpatient pharmacy:    [facility] [id number] [place] [facility] [address] [facility]  [address] [facility] [address]  Phone: [phone] Fax: [phone]    [facility] [address] [place] [address] AT [address] [place] [address]  Phone: [phone] Fax: [phone]    Referred to:  Dr. Benson     Reason for visit:  Hemangioma on eyelid     Problem List:  There is no problem list on file for this patient.      Allergies:   No Known Allergies    History:      No past medical history on file.    No past surgical history on file.    No family history on file.    Social History     Tobacco Use     Smoking status: Never Smoker     Smokeless tobacco: Never Used   Substance Use Topics     Alcohol use: Not on file

## 2019-05-29 ENCOUNTER — OFFICE VISIT (OUTPATIENT)
Dept: PLASTIC SURGERY | Facility: CLINIC | Age: 39
End: 2019-05-29
Payer: COMMERCIAL

## 2019-05-29 VITALS — HEIGHT: 76 IN | BODY MASS INDEX: 28.62 KG/M2 | WEIGHT: 235 LBS

## 2019-05-29 DIAGNOSIS — L98.9 CHANGING SKIN LESION: Primary | ICD-10-CM

## 2019-05-29 RX ORDER — CEFAZOLIN SODIUM 2 G/50ML
2 SOLUTION INTRAVENOUS
Status: CANCELLED | OUTPATIENT
Start: 2019-05-29

## 2019-05-29 RX ORDER — CEFAZOLIN SODIUM 1 G/50ML
1 INJECTION, SOLUTION INTRAVENOUS SEE ADMIN INSTRUCTIONS
Status: CANCELLED | OUTPATIENT
Start: 2019-05-29

## 2019-05-29 ASSESSMENT — MIFFLIN-ST. JEOR: SCORE: 2082.45

## 2019-05-29 ASSESSMENT — PAIN SCALES - GENERAL: PAINLEVEL: NO PAIN (0)

## 2019-05-29 NOTE — PROGRESS NOTES
REFERRING PROVIDER:  Nikos Buckley MD      PRESENTING COMPLAINT:  Consultation for a right lateral canthal skin lesion.      HISTORY OF PRESENTING COMPLAINT:  Mr. Shen is 38 years old.  He has had a lesion on the right lateral-most aspect of his lower lid/canthal region since he was age 12.  Over the last year or so, he has noticed that it has grown in size.  He is here to have it looked at.      PAST MEDICAL HISTORY:  ADHD.      PAST SURGICAL HISTORY:  Nil.      MEDICATIONS:  Adderall.      ALLERGIES:  Nil.      SOCIAL HISTORY:  Does not smoke, socially drinks, is doing his PhD.  Lives in [place].      REVIEW OF SYSTEMS:  Denies chest pain, shortness of breath, MI, CVA, DVT and PE.      PHYSICAL EXAMINATION:  Vital signs are stable.  He is afebrile, in no obvious distress.  He is 6 feet 4 inches, 234 pounds, BMI 29 kg/m2.  On examination of his right periocular area, he has about an 8 x 8 mm, lobulated, sessile, vascular-looking lesion over the right lateral lid just below the margin just near the canthal region.  The surrounding skin is normal.  Lower lid distraction is about 8 mm.  Snapback is slightly slow.  No lid malpositioning.      ASSESSMENT AND PLAN:  Based upon the above findings, a diagnosis of a changing skin lesion on the right lateral lid was made.  I had a long discussion with the patient about his pathophysiology.  I think the best course of action is to biopsy this through an excisional biopsy.  Closure can be done primarily with or without a canthopexy.  He may require further treatments if this comes back as a premalignant or malignant lesion, which is unlikely given the fact that it has been there for so long, but it has changed in its nature recently.  All risks, benefits and alternatives of the procedure, including pain, infection, bleeding, scarring, asymmetry, seromas, hematomas, wound breakdown, wound dehiscence, lid malpositioning, ectropion, entropion, retrobulbar hematoma,  corneal abrasion, requirement of further surgeries, prominent scar, hypertrophic scar, keloid scar, DVT, PE, MI, CVA, pneumonia, renal failure and death, were explained.  He understood them all and wants to proceed.  I look forward to helping him out in the near future.      Total time spent with the patient was 30 minutes, of which more than half was counseling.       cc:   Nikos Buckley MD   [address] [address]

## 2019-05-29 NOTE — NURSING NOTE
"Chief Complaint   Patient presents with     Consult     new pt here for consult for hemangioma on R lower eyelid       Vitals:    05/29/19 0959   Weight: 106.6 kg (235 lb)   Height: 1.93 m (6' 4\")       Body mass index is 28.61 kg/m .    Michael Brown, EARNESTINET     Vitals not taken per provider order.  Unable to obtain history as provider wanted to see patient.    "

## 2019-05-29 NOTE — LETTER
5/29/2019       RE: Maico Shen  [address]  [facility] [address]     Dear Colleague,    Thank you for referring your patient, Maico Shen, to the [facility] SURGERY at [facility]. Please see a copy of my visit note below.    REFERRING PROVIDER:  Nikos Buckley MD      PRESENTING COMPLAINT:  Consultation for a right lateral canthal skin lesion.      HISTORY OF PRESENTING COMPLAINT:  Mr. Shen is 38 years old.  He has had a lesion on the right lateral-most aspect of his lower lid/canthal region since he was age 12.  Over the last year or so, he has noticed that it has grown in size.  He is here to have it looked at.      PAST MEDICAL HISTORY:  ADHD.      PAST SURGICAL HISTORY:  Nil.      MEDICATIONS:  Adderall.      ALLERGIES:  Nil.      SOCIAL HISTORY:  Does not smoke, socially drinks, is doing his PhD.  Lives in [place].      REVIEW OF SYSTEMS:  Denies chest pain, shortness of breath, MI, CVA, DVT and PE.      PHYSICAL EXAMINATION:  Vital signs are stable.  He is afebrile, in no obvious distress.  He is 6 feet 4 inches, 234 pounds, BMI 29 kg/m2.  On examination of his right periocular area, he has about an 8 x 8 mm, lobulated, sessile, vascular-looking lesion over the right lateral lid just below the margin just near the canthal region.  The surrounding skin is normal.  Lower lid distraction is about 8 mm.  Snapback is slightly slow.  No lid malpositioning.      ASSESSMENT AND PLAN:  Based upon the above findings, a diagnosis of a changing skin lesion on the right lateral lid was made.  I had a long discussion with the patient about his pathophysiology.  I think the best course of action is to biopsy this through an excisional biopsy.  Closure can be done primarily with or without a canthopexy.  He may require further treatments if this comes back as a premalignant or malignant lesion, which is unlikely given the fact that it  has been there for so long, but it has changed in its nature recently.  All risks, benefits and alternatives of the procedure, including pain, infection, bleeding, scarring, asymmetry, seromas, hematomas, wound breakdown, wound dehiscence, lid malpositioning, ectropion, entropion, retrobulbar hematoma, corneal abrasion, requirement of further surgeries, prominent scar, hypertrophic scar, keloid scar, DVT, PE, MI, CVA, pneumonia, renal failure and death, were explained.  He understood them all and wants to proceed.  I look forward to helping him out in the near future.      Total time spent with the patient was 30 minutes, of which more than half was counseling.       cc:   [place] [address]         Again, thank you for allowing me to participate in the care of your patient.      Sincerely,    TOM Benson MD

## 2019-05-31 ENCOUNTER — TELEPHONE (OUTPATIENT)
Dept: SURGERY | Facility: CLINIC | Age: 39
End: 2019-05-31

## 2019-05-31 NOTE — TELEPHONE ENCOUNTER
Left message to schedule procedure with Dr Henrik Benson    Details left with my direct contact number for scheduling.     Cherise Govea  [facility] Coordinator  [phone]

## 2019-06-12 NOTE — TELEPHONE ENCOUNTER
"Left 3rd message to schedule procedure with Dr Henrik Benson    Details left with my direct contact number for scheduling.   Will be put in \"patient not ready:\" folder    Cherise Govea  [facility] Coordinator  [phone]    "

## 2019-10-08 ENCOUNTER — THERAPY VISIT (OUTPATIENT)
Dept: PHYSICAL THERAPY | Facility: CLINIC | Age: 39
End: 2019-10-08
Payer: COMMERCIAL

## 2019-10-08 DIAGNOSIS — M67.979 ACHILLES TENDON DISORDER: ICD-10-CM

## 2019-10-08 PROCEDURE — 97110 THERAPEUTIC EXERCISES: CPT | Mod: GP | Performed by: PHYSICAL THERAPIST

## 2019-10-08 PROCEDURE — 97161 PT EVAL LOW COMPLEX 20 MIN: CPT | Mod: GP | Performed by: PHYSICAL THERAPIST

## 2019-10-08 NOTE — PROGRESS NOTES
Physical Therapy Initial Evaluation: Subjective History  October 8, 2019     Injury/Condition Details  Presenting Complaint Bilateral Achilles tendon pain   Onset Timing/Date 4/1/19   Mechanism Overuse with running, walking, lots of stairs     Symptom Behavior Details    Primary Symptoms Sporadic symptoms; Activity/position dependent   Worst Pain 5/10 (with running, lots of stairs)   Symptom Provocators Running, walking, lots of stairs   Best Pain 0/10    Symptom Relievers Rest   Time of day dependent? No   Recent symptom change? no change in symptoms     Prior Testing/Intervention for Current Condition  Prior Tests US   Prior Treatment Lots of self-treatment with exercise, stretching     Lifestyle & General Medical History  Employment    Usual physical activities  (within past year) Walking  Self-limiting activities due to tendon pain   Orthopaedic history none   Notable medical history Refer to EMR   Patient Reported Health good       Objective:  ANKLE/FOOT EXAMINATION    (*Indicates patient s pain)  ROM L R   Dorsiflexion (15-20) 25 25   Plantarflexion (40-55) 55 55   Inversion (30-35) 35 35   Eversion (10-15) 15 15   Midfoot mobility: mildly hypermobile rearfoot bilaterally with mildly hypomobile midfoot bilaterally      (*Indicates patient s pain)  Resisted Isometrics Strength (MMT)    L R   DF 5 5   PF (SL heelraises) Too painful for SL Too painful for SL   Inversion 5 5   Eversion 5 5     Other:  Great Toe Ext PROM: WNL on L, mild loss on R with bony deformity present on ventral surface    Assessment/Plan:    The patient is a 39 year old male with chief complaint of B Achilles pain.    The patient has the following significant findings with corresponding treatment plan.  Diagnosis 1:  B Achilles tendinopathy    Pain -  hot/cold therapy, manual therapy, splint/taping/bracing/orthotics and self management  Decreased ROM/flexibility - manual therapy, therapeutic exercise and home program  Decreased  joint mobility - manual therapy, therapeutic exercise and home program  Decreased strength - therapeutic exercise, therapeutic activities and home program  Impaired balance - neuro re-education, therapeutic activities and home program  Decreased proprioception - neuro re-education and therapeutic activities  Impaired muscle performance - neuro re-education and home program  Decreased function - therapeutic activities and home program  Impaired posture - neuro re-education, therapeutic activities and home program      Therapy Evaluation Codes:   1) History comprised of:   Personal factors that impact the plan of care:      Please refer to health history in EMR.    Comorbidity factors that impact the plan of care are:      Please refer to health history in EMR.     Medications impacting care: None.  2) Examination of Body Systems comprised of:   Body structures and functions that impact the plan of care:      Ankle.   Activity limitations that impact the plan of care are:      Running, Stairs and Walking.   Clinical presentation characteristics are:    Stable/Uncomplicated.  3) Presentation comprised of:   Presentation scored as Low complexity with uncomplicated characteristics..  4) Decision-Making    Low complexity using standardized patient assessment instrument and/or measureable assessment of functional outcome.  Cumulative Therapy Evaluation is: Low complexity.    Previous and current functional limitations:  (See Goal Flow Sheet for this information)    Short term and Long term goals: (See Goal Flow Sheet for this information)     Communication ability:  Patient appears to be able to clearly communicate and understand verbal and written communication and follow directions correctly.  Treatment Explanation - The following has been discussed with the patient: RX ordered/plan of care, anticipated outcomes, and possible risks and side effects.  This patient would benefit from PT intervention to resume normal  activities.   Rehab potential is good.    Frequency:  2 X a month, once daily  Duration:  for 3 months  Discharge Plan: Achieve all LTGs, be independent in home treatment program, and reach maximal therapeutic benefit.    Please refer to the daily flowsheet for treatment today, total treatment time and time spent performing 1:1 timed codes.

## 2019-10-10 DIAGNOSIS — M67.979 ACHILLES TENDON DISORDER, UNSPECIFIED LATERALITY: Primary | ICD-10-CM

## 2019-10-28 ENCOUNTER — THERAPY VISIT (OUTPATIENT)
Dept: PHYSICAL THERAPY | Facility: CLINIC | Age: 39
End: 2019-10-28
Payer: COMMERCIAL

## 2019-10-28 DIAGNOSIS — M67.979 ACHILLES TENDON DISORDER, UNSPECIFIED LATERALITY: ICD-10-CM

## 2019-10-28 PROCEDURE — 97112 NEUROMUSCULAR REEDUCATION: CPT | Mod: GP | Performed by: PHYSICAL THERAPIST

## 2019-10-28 PROCEDURE — 97110 THERAPEUTIC EXERCISES: CPT | Mod: GP | Performed by: PHYSICAL THERAPIST

## 2019-10-28 PROCEDURE — 97530 THERAPEUTIC ACTIVITIES: CPT | Mod: GP | Performed by: PHYSICAL THERAPIST

## 2019-11-08 ENCOUNTER — VIRTUAL VISIT (OUTPATIENT)
Dept: FAMILY MEDICINE | Facility: OTHER | Age: 39
End: 2019-11-08

## 2019-11-08 NOTE — PROGRESS NOTES
"Date: 2019 06:37:52  Clinician: Es Burgos  Clinician NPI: [id number]  Patient: Maico Shen  Patient : 1980  Patient Address: [address] [place] [id number]  Patient Phone: [phone]  Visit Protocol: URI  Patient Summary:  Maico is a 39 year old ( : 1980 ) male who initiated a Visit for cold, sinus infection, or influenza. When asked the question \"Please sign me up to receive news, health information and promotions from [employer].\", Maico responded \"No\".    Maico states his symptoms started gradually 2-3 weeks ago. After his symptoms started, they improved and then got worse again.   His symptoms consist of rhinitis, a cough, facial pain or pressure, enlarged lymph nodes, and nasal congestion. He is experiencing difficulty breathing due to nasal congestion but he is not short of breath.   Symptom details     Nasal secretions: The color of his mucus is green, blood-tinged, and yellow.    Cough: Maico coughs a few times an hour and his cough is more bothersome at night. Phlegm comes into his throat when he coughs. He believes the phlegm causes the cough. The color of the phlegm is yellow.     Facial pain or pressure: The facial pain or pressure feels worse when bending over or leaning forward.      Maico denies having headache, sore throat, malaise, wheezing, teeth pain, fever, myalgias, chills, and ear pain. He also denies having recent facial or sinus surgery in the past 60 days and taking antibiotic medication for the symptoms.   Precipitating events  He has not recently been exposed to someone with influenza. Maico has not been in close contact with any high risk individuals.   Pertinent medical history  Maico had 3 sinus infections within the past year.   Weight: 230 lbs   Maico does not smoke or use smokeless tobacco.     MEDICATIONS: Adderall oral, ALLERGIES: NKDA  Clinician Response:  Dear Maico,  Based on the information provided, you have acute " bacterial sinusitis, also known as a sinus infection. Sinus infections are caused by bacteria or a virus and symptoms are almost always identical. The difference between the 2 types of infections is timing.  Sinus infections start as viral infections and symptoms improve on their own in about 7 days. If symptoms have not improved after 7 days or have even worsened, a bacterial infection may have developed.  Medication information  I am prescribing:       Fluticasone 50 mcg/actuation nasal spray. Inhale 2 sprays in each nostril 1 time per day; after 1 week, may adjust to 1 - 2 sprays in each nostril 1 time per day. This medication takes several days to start working, so keep taking it even if it doesn't help right away. There are no refills with this prescription.      Amoxicillin-pot clavulanate 875-125 mg oral tablet. Take 1 tablet by mouth every 12 hours for 10 days. There are no refills with this prescription.      Benzonatate (Tessalon Perles) 100 mg oral capsule. Take 1-2 capsules by mouth 3 times per day as needed for your cough. There are no refills with this prescription.     Unless you are allergic to the over-the-counter medication(s) below, I recommend using:       Acetaminophen (Tylenol or store brand) oral tablet. Take 1-2 tablets by mouth every 4-6 hours to help with the discomfort.      Ibuprofen (Advil or store brand) 200 mg oral tablet. Take 1-3 tablets (200-600 mg) by mouth every 8 hours to help with the discomfort. Make sure to take the ibuprofen with food. Do not exceed 2400 mg in 24 hours.    A decongestant such as Sudafed PE or store brand.      Guaifenesin + dextromethorphan (Robitussin DM, Mucinex DM, or store brand).    A sinus irrigation kit such as Sinus Rinse, Neti Pot, SinuCleanse, or store brand. Be sure to use sterile or previously boiled water to prevent unwanted infections.     Over-the-counter medications do not require a prescription. Ask the pharmacist if you have any questions.   Self care  The following tips will keep you as comfortable as possible while you recover:     Rest    Drink plenty of water and other liquids    Take a hot shower to loosen congestion    Take a spoonful of honey to reduce your cough     When to seek care  Please be seen in a clinic or urgent care if any of the following occur:     Symptoms do not start to improve after 3 days of treatment    New symptoms develop, or symptoms become worse     It is possible to have an allergic reaction to an antibiotic even if you have not had one in the past. If you notice a new rash, significant swelling, or difficulty breathing, stop taking this medication immediately and go to a clinic or urgent care.   Diagnosis: Acute bacterial sinusitis  Diagnosis ICD: J01.90  Prescription: fluticasone 50 mcg/actuation nasal spray,suspension 1 120 spray aerosol with adapter (grams), 30 days supply. Inhale 2 sprays in each nostril 1 time per day; after 1 week, may adjust to 1 - 2 sprays in each nostril 1 time per day.. Refills: 0, Refill as needed: no, Allow substitutions: yes  Prescription: amoxicillin-pot clavulanate 875-125 mg oral tablet 20 tablet, 10 days supply. Take 1 tablet by mouth every 12 hours for 10 days. Refills: 0, Refill as needed: no, Allow substitutions: yes  Prescription: benzonatate (Tessalon Perles) 100 mg oral capsule 30 capsule, 5 days supply. Take 1-2 capsules by mouth 3 times per day as needed. Refills: 0, Refill as needed: no, Allow substitutions: yes  Pharmacy: [facility] #[id number] - [phone] - [address] [place] [address]

## 2019-11-29 PROBLEM — M67.979 ACHILLES TENDON DISORDER: Status: RESOLVED | Noted: 2019-10-08 | Resolved: 2019-11-29

## 2019-11-29 NOTE — PROGRESS NOTES
Discharge Note    Progress reporting period is from initial eval to Oct 28, 2019.     Maico failed to return for next follow up visit and current status is unknown.  Please see information below for last relevant information on current status.  Patient seen for Rxs Used: 1 visits.    SUBJECTIVE  Subjective changes noted by patient:  Subjective: Found exercises to be easy but effective. Having nearly no Achilles pain. Still not in a rush to do any running before next spring. .  Current pain level is Current Pain level: 0/10.     Previous pain level was  Initial Pain level: 5/10.   Changes in function:  Yes (See Goal flowsheet attached for changes in current functional level)  Adverse reaction to treatment or activity: None    OBJECTIVE  Changes noted in objective findings: Objective: Hip extension, abduction, IR, ER, hamstring, quad strength 5/5, though does demonstrate knee valgus and femoral IR during SL squat. .    ASSESSMENT/PLAN  Diagnosis: B Achilles tendinopathy   DIAGP:  The encounter diagnosis was Achilles tendon disorder, unspecified laterality.  Updated problem list and treatment plan:     Decreased ROM/flexibility - HEP  Decreased function - HEP  Decreased strength - HEP    STG/LTGs have been met or progress has been made towards goals:  Yes, please see goal flowsheet for most current information.    Assessment of Progress: current status is unknown.  Last current status: Assessment of progress: Pt is progressing well.  Self Management Plans:  HEP    I have re-evaluated this patient and find that the nature, scope, duration and intensity of the therapy is appropriate for the medical condition of the patient.  Maico continues to require the following intervention to meet STG and LTG's:  HEP.    Recommendations:  Discharge with current home program.  Patient to follow up with MD as needed. Episode to be closed at this time and patient formally discharged from therapy.    Danny Galvan, PT, DPT,  OCS      Please refer to the daily flowsheet for treatment today, total treatment time and time spent performing 1:1 timed codes.

## 2020-03-10 ENCOUNTER — HEALTH MAINTENANCE LETTER (OUTPATIENT)
Age: 40
End: 2020-03-10

## 2020-07-30 ENCOUNTER — VIRTUAL VISIT (OUTPATIENT)
Dept: FAMILY MEDICINE | Facility: CLINIC | Age: 40
End: 2020-07-30

## 2020-07-30 DIAGNOSIS — F90.0 ATTENTION DEFICIT HYPERACTIVITY DISORDER (ADHD), PREDOMINANTLY INATTENTIVE TYPE: Primary | ICD-10-CM

## 2020-07-30 DIAGNOSIS — Z79.899 CONTROLLED SUBSTANCE AGREEMENT SIGNED: ICD-10-CM

## 2020-07-30 PROCEDURE — 99203 OFFICE O/P NEW LOW 30 MIN: CPT | Mod: 95 | Performed by: NURSE PRACTITIONER

## 2020-07-30 RX ORDER — DEXTROAMPHETAMINE SACCHARATE, AMPHETAMINE ASPARTATE, DEXTROAMPHETAMINE SULFATE AND AMPHETAMINE SULFATE 5; 5; 5; 5 MG/1; MG/1; MG/1; MG/1
20 TABLET ORAL DAILY
Qty: 45 TABLET | Refills: 0 | Status: SHIPPED | OUTPATIENT
Start: 2020-07-30 | End: 2020-10-21

## 2020-07-30 NOTE — PATIENT INSTRUCTIONS
Adderall refilled today - #45 pills, no refills. Take 1 tab (20 mg) once daily. Can take additional 0.5 tab (10 mg) in afternoon if needed.    Will need to sign controlled substance agreement next time in clinic.    Please send records to our clinic.    Thank you for coming in today! If you receive a survey via My Chart or mail please let us know if there was anything you especially appreciated today or if there is any way we can improve our clinic. We value your input.     General Information:    Today you had your appointment with Rosalinda Cooper CNP  My hours are:    Monday 8 AM - 5 PM  Tuesday: 8 AM - 5 PM  Thursday: 8 AM - 5 PM  Fridays: 8 AM - 5 PM    I am not in the office Wednesdays. Therefore non urgent calls received on Wednesday will be addressed when I am back in the office on Thursday.     If lab work was done today as part of your evaluation you will generally be contacted via My Chart, mail, or phone with the results within 1-5 days. If there is an alarming result we will contact you by phone. Lab results come back at varying times, I generally wait until all labs are resulted before making comments on results. Please note labs are automatically released to My Chart once available.     If you need refills please contact your pharmacist. They will send a refill request to me to review. Please allow 3 business days for us to process all refill requests.     Please call or send a medical message with any questions or concerns

## 2020-07-30 NOTE — LETTER
[facility]  07/30/20    Patient: Maico Shen  YOB: 1980  Medical Record Number: [id number]  CSN: [id number]                                                                              Non-opioid Controlled Substance Agreement    I understand that my care provider has prescribed a controlled substance to help manage my condition(s). I am taking this medicine to help me function or work. I know this is strong medicine, and that it can cause serious side effects. Controlled substances can be sedating, addicting and may cause a dependency on the drug. They can affect my ability to drive or think, and cause depression. They need to be taken exactly as prescribed. Combining controlled substances with certain medicines or chemicals (such as cocaine, sedatives and tranquilizers, sleeping pills, meth) can be dangerous or even fatal. Also, if I stop controlled substances suddenly, I may have severe withdrawal symptoms.  If not helpful, I may be asked to stop them.    The risks, benefits, and side effects of these medicine(s) were explained to me. I agree that:  Medication: Adderall 20 mg tabs. Take 20 mg once daily in AM. Can take 0.5 mg (10 mg) in afternoon if needed. Max 45 tabs in 30 days.  Pharmacy: [id number] [address] AT [address] [place] [address]   1. I will take part in other treatments as advised by my care team. This may be psychiatry or counseling, physical therapy, behavioral therapy, group treatment or a referral to a pain clinic. I will reduce or stop my medicine when my care team tells me to do so.  2. I will take my medicines as prescribed. I will not change the dose or schedule unless my care team tells me to. There will be no refills if I  run out early.   I may be contactedwithout warning and asked to complete a urine drug test or pill count at any time.   3. I will keep all my appointments, and understand this is part of the  monitoring of controlled substances. My care team may require an office visit for EVERY controlled substance refill. If I miss appointments or don t follow instructions, my care team may stop my medicine.  4. I will not ask other providers to prescribe controlled substances, and I will not accept controlled substances from other people. If I need another prescribed controlled substance for a new reason, I will tell my care team within 1 business day.  5. I will use one pharmacy to fill all of my controlled substance prescriptions, and it is up to me to make sure that I do not run out of my medicines on weekends or holidays. If my care team is willing to refill my controlled substance prescription without a visit, I must request refills only during office hours, refills may take up to 3 days to process, and it may take up to 5 to 7 days for my medicine to be mailed and ready at my pharmacy. Prescriptions will not be mailed anywhere except my pharmacy.    6. I am responsible for my prescriptions. If the medicine/prescription is lost or stolen, it will not be replaced. I also agree not to share controlled substance medicines with anyone.              [facility]  07/30/20  Patient:  Maico Shen  YOB: 1980  Medical Record Number: [id number]  CSN: [id number]    7. I agree to not use ANY illegal or recreational drugs. This includes marijuana, cocaine, bath salts or other drugs. I agree not to use alcohol unless my care team says I may. I agree to give urine samples whenever asked. If I don t give a urine sample, the care team may stop my medicine.    8. If I enroll in the [place] Medical Marijuana program, I will tell my care team. I will also sign an agreement to share my medical records with my care team.    9. I will bring in my list of medicines (or my medicine bottles) each time I come to the clinic.   10. I will tell my care team right away if I become pregnant or have a new medical  problem treated outside of my regular clinic.  11. I understand that this medicine can affect my thinking and judgment. It may be unsafe for me to drive, use machinery and do dangerous tasks. I will not do any of these things until I know how the medicine affects me. If my dose changes, I will wait to see how it affects me. I will contact my care team if I have concerns about medicine side effects.    I understand that if I do not follow any of the conditions above, my prescriptions or treatment may be stopped.      I agree that my provider, clinic care team, and pharmacy may work with any city, state or federal law enforcement agency that investigates the misuse, sale, or other diversion of my controlled medicine. I will allow my provider to discuss my care with or share a copy of this agreement with any other treating provider, pharmacy or emergency room where I receive care. I agree to give up (waive) any right of privacy or confidentiality with respect to these consents.   I have read this agreement and have asked questions about anything I did not understand.    ____________________________________________________    ____________  ________  Patient signature                                                         Date      Time    ____________________________________________________     ____________  ________  Witness                                                          Date      Time    ____________________________________________________  Provider signature

## 2020-07-30 NOTE — PROGRESS NOTES
"Problem(s) Oriented visit      Video-Visit Details    Type of service:  Video Visit    Video Start Time (time video started): 0755    Video End Time (time video stopped): 0816    Originating Location (pt. Location): Home    Distant Location (provider location):  [facility]     Mode of Communication:  Video Conference via Zoom    Physician has received verbal consent for a Video Visit from the patient? Yes      Rosalinda Cooper NP      \    SUBJECTIVE:                                                    Maico Shen is a 39 year old male who presents to clinic today for the following health issues :    ADHD    Onset: since childhood - says he was diagnosed around 8th grade from what he remembers    Description:   Easily distracted: YES  Short attention span: YES  Trouble following directions: YES   Impulsive behavior: no   Trouble completing tasks: YES - currently working on dissertation for food science at the [employer]    Accompanying Signs & Symptoms:        Change in sleep pattern: No  Irritability at certain times of the day: no  Socially withdrawn: no  Depression symptoms: no  Anxiety symptoms: YES- has \"baseline anxiety\"    History:  Caffeine intake: Moderate - about 2 cups of coffee/day  Loss of appetite: no  Healthy diet: YES  Did you have problems in school or with previous employment: no  Family history of ADHD: unknown  Have you had an evaluation for ADHD in the past: yes when he was younger (8th grade). Most recently seen at [facility] and prescribed Adderall by mental health practitioner. Takes 20 mg daily on weekdays in the morning. Takes additional 10 mg in the afternoon when needed  Do you use alcohol or drugs: YES- drinks 2 glasses of wine about 2 times per week    Therapies tried: currently taking Adderall 20 mg daily with additional 10 mg in afternoon when needed with total relief    Has history of borderline elevated blood pressure. Not on medication. Checks it " regularly at home and generally about 120/70.    Problem list, Medication list, Allergies, and Medical/Social/Surgical histories reviewed in [facility] and updated as appropriate.   Additional history: as documented    ROS:  5 point ROS completed and negative except noted above, including Gen, CV, Resp, GI, MS    Histories:   Patient Active Problem List   Diagnosis     Attention deficit hyperactivity disorder (ADHD), predominantly inattentive type     Controlled substance agreement signed     Past Surgical History:   Procedure Laterality Date     NO HISTORY OF SURGERY         Social History     Tobacco Use     Smoking status: Never Smoker     Smokeless tobacco: Never Used   Substance Use Topics     Alcohol use: Not on file     Family History   Problem Relation Age of Onset     Chronic Obstructive Pulmonary Disease Mother      Asthma Mother      Diabetes Father      Heart Disease Father      Cerebrovascular Disease Father         stroke           OBJECTIVE:                                                    No vitals taken due to telehealth visit  GENERAL: healthy, alert and no distress  EYES: Eyes grossly normal to inspection; conjunctivae and sclerae normal  RESP: no coughing, normal work of breathing  CV: well perfused  NEURO: Mentation intact and speech normal  PSYCH: Affect normal/bright     ASSESSMENT/PLAN:                                                      Maico was seen today for refill request.    Diagnoses and all orders for this visit:    Attention deficit hyperactivity disorder (ADHD), predominantly inattentive type  -     amphetamine-dextroamphetamine (ADDERALL) 20 MG tablet; Take 1 tablet (20 mg) by mouth daily Take additional 0.5 tab (10 mg) in the afternoon if needed.    Controlled substance agreement signed   Created document and will mail to patient to sign and send back.      FUTURE APPOINTMENTS:       - Follow-up visit in 3 months  Patient Instructions   Adderall refilled today - #45 pills, no  refills. Take 1 tab (20 mg) once daily. Can take additional 0.5 tab (10 mg) in afternoon if needed.    Will need to sign controlled substance agreement next time in clinic.    Please send records to our clinic.    Thank you for coming in today! If you receive a survey via My Chart or mail please let us know if there was anything you especially appreciated today or if there is any way we can improve our clinic. We value your input.     General Information:    Today you had your appointment with Rosalinda Cooper CNP  My hours are:    Monday 8 AM - 5 PM  Tuesday: 8 AM - 5 PM  Thursday: 8 AM - 5 PM  Fridays: 8 AM - 5 PM    I am not in the office Wednesdays. Therefore non urgent calls received on Wednesday will be addressed when I am back in the office on Thursday.     If lab work was done today as part of your evaluation you will generally be contacted via My Chart, mail, or phone with the results within 1-5 days. If there is an alarming result we will contact you by phone. Lab results come back at varying times, I generally wait until all labs are resulted before making comments on results. Please note labs are automatically released to My Chart once available.     If you need refills please contact your pharmacist. They will send a refill request to me to review. Please allow 3 business days for us to process all refill requests.     Please call or send a medical message with any questions or concerns        The following health maintenance items are reviewed in Epic and correct as of today:  Health Maintenance   Topic Date Due     PREVENTIVE CARE VISIT  1980     HIV SCREENING  08/01/1994     DTAP/TDAP/TD IMMUNIZATION (1 - Tdap) 08/01/2004     LIPID  08/01/2014     PHQ-2  01/01/2020     INFLUENZA VACCINE (1) 09/01/2020     IPV IMMUNIZATION  Aged Out     MENINGITIS IMMUNIZATION  Aged Out     HEPATITIS B IMMUNIZATION  Aged Out       Rosalinda Cooper NP  [facility]  Family Practice  [facility]  Group  [phone]    For any issues my office # is [phone]

## 2020-12-09 ENCOUNTER — VIRTUAL VISIT (OUTPATIENT)
Dept: FAMILY MEDICINE | Facility: CLINIC | Age: 40
End: 2020-12-09

## 2020-12-09 DIAGNOSIS — F90.0 ATTENTION DEFICIT HYPERACTIVITY DISORDER (ADHD), PREDOMINANTLY INATTENTIVE TYPE: Primary | ICD-10-CM

## 2020-12-09 PROCEDURE — 99213 OFFICE O/P EST LOW 20 MIN: CPT | Mod: 95 | Performed by: NURSE PRACTITIONER

## 2020-12-09 RX ORDER — DEXTROAMPHETAMINE SACCHARATE, AMPHETAMINE ASPARTATE, DEXTROAMPHETAMINE SULFATE AND AMPHETAMINE SULFATE 5; 5; 5; 5 MG/1; MG/1; MG/1; MG/1
20 TABLET ORAL DAILY
Qty: 45 TABLET | Refills: 0 | Status: SHIPPED | OUTPATIENT
Start: 2020-12-09 | End: 2021-02-04

## 2020-12-09 NOTE — PATIENT INSTRUCTIONS
Adderall refilled.    Request refills when needed.    Next follow-up in 6 months (June 2021). Plan for physical at that time

## 2020-12-09 NOTE — PROGRESS NOTES
Problem(s) Oriented visit      Video-Visit Details    Type of service:  Video Visit    Video Start Time (time video started): 1424    Video End Time (time video stopped): 1428    Originating Location (pt. Location): Home    Distant Location (provider location):  [facility]     Mode of Communication:  Video Conference via [facility]    Physician has received verbal consent for a Video Visit from the patient? Yes    This was a virtual video-visit conducted during COVID-19 outbreak in regulation with social distancing and quarantine recommendations of the CDC and [place] department of health and human services. A two way audio/video connection was used in real time with patient's consent.    JESIKA Arnold CNP    SUBJECTIVE:                                                    Maico Shen is a 40 year old male who presents to clinic today for the following health issues :    Attention deficit hyperactivity disorder (ADHD), predominantly inattentive type - well controlled with Adderall 20 mg daily. Was occasionally taking 10 mg in afternoon if needed, but not doing that much now. 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 Everything going well. Goes through 45 tablets of 20 mg Adderall every 1.5 - 2 months approximately.    Problem list, Medication list, Allergies, and Medical/Social/Surgical histories reviewed in [facility] and updated as appropriate.   Additional history: as documented    ROS:  3 point ROS completed and negative except noted above, including Gen, Neuro, Psych    Histories:   Patient Active Problem List   Diagnosis     Attention deficit hyperactivity disorder (ADHD), predominantly inattentive type     Controlled substance agreement signed     Past Surgical History:   Procedure Laterality Date     NO HISTORY OF SURGERY         Social History     Tobacco Use     Smoking status: Never Smoker     Smokeless tobacco: Never Used   Substance  Use Topics     Alcohol use: Not on file     Family History   Problem Relation Age of Onset     Chronic Obstructive Pulmonary Disease Mother      Asthma Mother      Diabetes Father      Heart Disease Father      Cerebrovascular Disease Father         stroke           OBJECTIVE:                                                    No vitals taken due to telehealth visit    APPEARANCE: = Relaxed and in no distress  Resp effort = Calm regular breathing  Recent/Remote Memory = Alert and Oriented x 3  Mood/Affect = Cooperative and interested     ASSESSMENT/PLAN:                                                      Maico was seen today for refill request.    Diagnoses and all orders for this visit:    Attention deficit hyperactivity disorder (ADHD), predominantly inattentive type  -     amphetamine-dextroamphetamine (ADDERALL) 20 MG tablet; Take 1 tablet (20 mg) by mouth daily Take additional 0.5 tab (10 mg) in the afternoon if needed.  PDMP Review       Value Time User    State PDMP site checked  Yes 12/9/2020  2:26 PM Rosalinda Cooper APRN CNP      Refilled today.   Refill requests to be approved for next 6 months if patient not having concerns.    See Patient Instructions  Patient Instructions   Adderall refilled.    Request refills when needed.    Next follow-up in 6 months (June 2021). Plan for physical at that time      The following health maintenance items are reviewed in Epic and correct as of today:  Health Maintenance   Topic Date Due     PREVENTIVE CARE VISIT  1980     HIV SCREENING  08/01/1994     HEPATITIS C SCREENING  08/01/1997     DTAP/TDAP/TD IMMUNIZATION (1 - Tdap) 08/01/2004     LIPID  08/01/2014     PHQ-2  Completed     INFLUENZA VACCINE  Completed     Pneumococcal Vaccine: Pediatrics (0 to 5 Years) and At-Risk Patients (6 to 64 Years)  Aged Out     IPV IMMUNIZATION  Aged Out     MENINGITIS IMMUNIZATION  Aged Out     HEPATITIS B IMMUNIZATION  Aged Out       JESIKA Arnold CNP  [facility]  [facility]  [phone]    For any issues my office # is [phone]

## 2021-02-04 ENCOUNTER — MYC REFILL (OUTPATIENT)
Dept: FAMILY MEDICINE | Facility: CLINIC | Age: 41
End: 2021-02-04

## 2021-02-04 DIAGNOSIS — F90.0 ATTENTION DEFICIT HYPERACTIVITY DISORDER (ADHD), PREDOMINANTLY INATTENTIVE TYPE: ICD-10-CM

## 2021-02-04 RX ORDER — DEXTROAMPHETAMINE SACCHARATE, AMPHETAMINE ASPARTATE, DEXTROAMPHETAMINE SULFATE AND AMPHETAMINE SULFATE 5; 5; 5; 5 MG/1; MG/1; MG/1; MG/1
20 TABLET ORAL DAILY
Qty: 45 TABLET | Refills: 0 | Status: SHIPPED | OUTPATIENT
Start: 2021-02-04 | End: 2021-04-05

## 2021-02-04 NOTE — CONFIDENTIAL NOTE
Last OV  Virtual with Norma on 12/09/2020 for ADHD.   No labs done.   Per   Last filled for  #45  On 12/09, 10/21,  07/30.

## 2021-02-04 NOTE — TELEPHONE ENCOUNTER
Adderall refilled 45 tabs 0 refills.    PDMP Review       Value Time User    State PDMP site checked  Yes 2/4/2021  4:29 PM Rosalinda Cooper APRN CNP Jennifer Wilson, APRN CNP on 2/4/2021 at 4:29 PM

## 2021-04-05 ENCOUNTER — MYC REFILL (OUTPATIENT)
Dept: FAMILY MEDICINE | Facility: CLINIC | Age: 41
End: 2021-04-05

## 2021-04-05 DIAGNOSIS — F90.0 ATTENTION DEFICIT HYPERACTIVITY DISORDER (ADHD), PREDOMINANTLY INATTENTIVE TYPE: ICD-10-CM

## 2021-04-05 RX ORDER — DEXTROAMPHETAMINE SACCHARATE, AMPHETAMINE ASPARTATE, DEXTROAMPHETAMINE SULFATE AND AMPHETAMINE SULFATE 5; 5; 5; 5 MG/1; MG/1; MG/1; MG/1
20 TABLET ORAL DAILY
Qty: 45 TABLET | Refills: 0 | Status: SHIPPED | OUTPATIENT
Start: 2021-04-05 | End: 2021-06-08

## 2021-04-05 NOTE — TELEPHONE ENCOUNTER
Last visit 12/9/2020-virtual visit, no future office visit scheduled at this time. Patient goes through Adderall 20mg #45 every 1.5-2 months per visit note 12/9/20. Per office visit note patient will be due to be seen for medication check 6/2021. [place]:  Adderall 20mg #45  2/4/2021  12/9/2020  10/21/2020  7/30/2020    Refill routed to Rosalinda Cooper CNP for review. Melody Sotomayor

## 2021-04-24 ENCOUNTER — HEALTH MAINTENANCE LETTER (OUTPATIENT)
Age: 41
End: 2021-04-24

## 2021-06-08 ENCOUNTER — MYC REFILL (OUTPATIENT)
Dept: FAMILY MEDICINE | Facility: CLINIC | Age: 41
End: 2021-06-08

## 2021-06-08 ENCOUNTER — VIRTUAL VISIT (OUTPATIENT)
Dept: FAMILY MEDICINE | Facility: CLINIC | Age: 41
End: 2021-06-08

## 2021-06-08 DIAGNOSIS — F90.0 ATTENTION DEFICIT HYPERACTIVITY DISORDER (ADHD), PREDOMINANTLY INATTENTIVE TYPE: ICD-10-CM

## 2021-06-08 PROCEDURE — 99213 OFFICE O/P EST LOW 20 MIN: CPT | Mod: GT | Performed by: NURSE PRACTITIONER

## 2021-06-08 RX ORDER — DEXTROAMPHETAMINE SACCHARATE, AMPHETAMINE ASPARTATE, DEXTROAMPHETAMINE SULFATE AND AMPHETAMINE SULFATE 5; 5; 5; 5 MG/1; MG/1; MG/1; MG/1
20 TABLET ORAL DAILY
Qty: 45 TABLET | Refills: 0 | Status: CANCELLED | OUTPATIENT
Start: 2021-06-08

## 2021-06-08 RX ORDER — DEXTROAMPHETAMINE SACCHARATE, AMPHETAMINE ASPARTATE, DEXTROAMPHETAMINE SULFATE AND AMPHETAMINE SULFATE 5; 5; 5; 5 MG/1; MG/1; MG/1; MG/1
20 TABLET ORAL DAILY
Qty: 45 TABLET | Refills: 0 | Status: SHIPPED | OUTPATIENT
Start: 2021-06-08 | End: 2021-08-04

## 2021-06-08 NOTE — PROGRESS NOTES
Problem(s) Oriented visit    Video-Visit Details    Type of service:  Video Visit    Video Start Time (time video started): 1109    Video End Time (time video stopped): 1114    Originating Location (pt. Location): Home    Distant Location (provider location):  [facility]     Mode of Communication:  Video Conference via [facility]    Physician has received verbal consent for a Video Visit from the patient? Yes    This was a virtual video-visit conducted during COVID-19 outbreak in regulation with social distancing and quarantine recommendations of the CDC and [place] department of health and human services. A two way audio/video connection was used in real time with patient's consent.    JESIKA Arnold CNP    SUBJECTIVE:                                                    Maico Shen is a 40 year old male who presents to clinic today for the following health issues :    Taking Adderall for ADHD. Takes 20 mg daily during the week with 10 mg in afternoon as needed. Only takes on weekend as needed. 45 tabs of 20 md Adderall generally lasts him about 2 months. Denies appetite suppression, difficulty sleeping. Work going well. No concerns.    Problem list, Medication list, Allergies, and Medical/Social/Surgical histories reviewed in [facility] and updated as appropriate.   Additional history: as documented    ROS:  2 point ROS completed and negative except noted above, including Gen, Psych    OBJECTIVE:                                                    No vitals taken due to telehealth visit    APPEARANCE: = Relaxed and in no distress  Mood/Affect = Cooperative and interested     ASSESSMENT/PLAN:                                                      Maico was seen today for refill request.    Diagnoses and all orders for this visit:    Attention deficit hyperactivity disorder (ADHD), predominantly inattentive type  -     amphetamine-dextroamphetamine (ADDERALL) 20 MG tablet; Take 1 tablet (20 mg) by mouth  daily Take additional 0.5 tab (10 mg) in the afternoon if needed.  Reviewed the status of his AD(H)D management status at length. he is satisfied with his current treatment including performance in work or school.  Discussed specific treatment including their possible side effects and the fact that they are controlled substances which require special handling of prescriptions and our need for close monitoring including regular follow up.    he desires to continue the current medication and agrees to the current schedule for visits every 6 month(s).  I reminded him that any noncompliance with regard to prescriptions or follow up may result in my declining to provide further refills of these medications.    [place]  queried June 8, 2021: Yes: No concerns identified    See Patient Instructions  Patient Instructions   Due for physical exam with fasting labs    Adderall refilled without changes.     Okay for refills for next 6 months, then due for re-check      JESIKA Arnold CNP  [facility]  Family Practice  [facility]  [phone]    For any issues my office # is [phone]

## 2021-06-08 NOTE — PATIENT INSTRUCTIONS
Due for physical exam with fasting labs    Adderall refilled without changes.     Okay for refills for next 6 months, then due for re-check

## 2021-06-08 NOTE — CONFIDENTIAL NOTE
Called patient and left message this morning that he is due for med check on Adderall. Offered Rosalinda Cooper CNP has appts today if patient available.   Patient ended up seeing Rosalinda Cooper CNP virtually at 1115am today. Rx was sent to pharmacy at visit.   Gerri Marrufo RN

## 2021-08-04 ENCOUNTER — MYC REFILL (OUTPATIENT)
Dept: FAMILY MEDICINE | Facility: CLINIC | Age: 41
End: 2021-08-04

## 2021-08-04 DIAGNOSIS — F90.0 ATTENTION DEFICIT HYPERACTIVITY DISORDER (ADHD), PREDOMINANTLY INATTENTIVE TYPE: ICD-10-CM

## 2021-08-04 RX ORDER — DEXTROAMPHETAMINE SACCHARATE, AMPHETAMINE ASPARTATE, DEXTROAMPHETAMINE SULFATE AND AMPHETAMINE SULFATE 5; 5; 5; 5 MG/1; MG/1; MG/1; MG/1
20 TABLET ORAL DAILY
Qty: 45 TABLET | Refills: 0 | Status: SHIPPED | OUTPATIENT
Start: 2021-08-04 | End: 2021-09-24

## 2021-08-04 NOTE — TELEPHONE ENCOUNTER
Patient is requesting refill of Adderall 20mg QD #45 si po q AM with additional 1/2 tab (10mg) in afternoon if needed. Per Rosalinda Cooper CNP's 21 med visit note, #45 typically last patient 2 months. Due for med check 2021.     Per [place] PDMP, fills for past year:       Plan: routed refill request to Rosalinda Cooper CNP for review.  Gerri Marrufo RN

## 2021-09-24 ENCOUNTER — MYC REFILL (OUTPATIENT)
Dept: FAMILY MEDICINE | Facility: CLINIC | Age: 41
End: 2021-09-24

## 2021-09-24 DIAGNOSIS — F90.0 ATTENTION DEFICIT HYPERACTIVITY DISORDER (ADHD), PREDOMINANTLY INATTENTIVE TYPE: ICD-10-CM

## 2021-09-24 RX ORDER — DEXTROAMPHETAMINE SACCHARATE, AMPHETAMINE ASPARTATE, DEXTROAMPHETAMINE SULFATE AND AMPHETAMINE SULFATE 5; 5; 5; 5 MG/1; MG/1; MG/1; MG/1
20 TABLET ORAL DAILY
Qty: 45 TABLET | Refills: 0 | Status: SHIPPED | OUTPATIENT
Start: 2021-09-24 | End: 2021-11-24

## 2021-09-24 NOTE — TELEPHONE ENCOUNTER
MyChart refill request for Adderall IR 20mg #45-usually a 1.5-2 month supply. Last virtual office visit with Rosalinda Cooper CNP 6/8/21, per note plan to see patient Q6 months. Last CSA signed 7/20/20, no tox screen on file. [place] hx [place]        Refill routed to Rosalinda Cooper CNP for review. Melody Sotomayor

## 2021-10-09 ENCOUNTER — HEALTH MAINTENANCE LETTER (OUTPATIENT)
Age: 41
End: 2021-10-09

## 2021-11-24 ENCOUNTER — MYC REFILL (OUTPATIENT)
Dept: FAMILY MEDICINE | Facility: CLINIC | Age: 41
End: 2021-11-24

## 2021-11-24 DIAGNOSIS — F90.0 ATTENTION DEFICIT HYPERACTIVITY DISORDER (ADHD), PREDOMINANTLY INATTENTIVE TYPE: ICD-10-CM

## 2021-11-24 RX ORDER — DEXTROAMPHETAMINE SACCHARATE, AMPHETAMINE ASPARTATE, DEXTROAMPHETAMINE SULFATE AND AMPHETAMINE SULFATE 5; 5; 5; 5 MG/1; MG/1; MG/1; MG/1
20 TABLET ORAL DAILY
Qty: 45 TABLET | Refills: 0 | Status: SHIPPED | OUTPATIENT
Start: 2021-11-24 | End: 2022-02-04

## 2021-11-24 NOTE — TELEPHONE ENCOUNTER
MyChart refill request Adderall. Patient last office visit 6/8/21 for virtual visit with Rosalinda Cooper CNP. Per last office visit note patient to be seen for medication check Q6 months, no future office visit scheduled at this time. Last CSA signed 7/30/20, no tox screen on file. [place]     Routed to Rosalinda Cooper CNP for review. Differential message sent to patient to schedule medication check next month. Melody Sotomayor

## 2021-11-24 NOTE — TELEPHONE ENCOUNTER
Adderall refilled. Message sent to patient via [employer].     PDMP Review       Value Time User    State PDMP site checked  Yes 11/24/2021 11:59 AM Rosalinda Cooper APRN CNP Jennifer Wilson, APRN CNP on 11/24/2021 at 11:59 AM

## 2022-02-04 ENCOUNTER — OFFICE VISIT (OUTPATIENT)
Dept: FAMILY MEDICINE | Facility: CLINIC | Age: 42
End: 2022-02-04

## 2022-02-04 VITALS
BODY MASS INDEX: 30.37 KG/M2 | HEIGHT: 76 IN | RESPIRATION RATE: 16 BRPM | SYSTOLIC BLOOD PRESSURE: 134 MMHG | OXYGEN SATURATION: 97 % | HEART RATE: 82 BPM | DIASTOLIC BLOOD PRESSURE: 84 MMHG | WEIGHT: 249.38 LBS

## 2022-02-04 DIAGNOSIS — Z13.220 LIPID SCREENING: ICD-10-CM

## 2022-02-04 DIAGNOSIS — Z71.89 ADVANCED CARE PLANNING/COUNSELING DISCUSSION: ICD-10-CM

## 2022-02-04 DIAGNOSIS — F90.0 ATTENTION DEFICIT HYPERACTIVITY DISORDER (ADHD), PREDOMINANTLY INATTENTIVE TYPE: ICD-10-CM

## 2022-02-04 DIAGNOSIS — Z11.59 ENCOUNTER FOR HCV SCREENING TEST FOR LOW RISK PATIENT: ICD-10-CM

## 2022-02-04 DIAGNOSIS — Z13.1 SCREENING FOR DIABETES MELLITUS: ICD-10-CM

## 2022-02-04 DIAGNOSIS — Z23 NEED FOR TDAP VACCINATION: ICD-10-CM

## 2022-02-04 DIAGNOSIS — Z79.899 CONTROLLED SUBSTANCE AGREEMENT SIGNED: ICD-10-CM

## 2022-02-04 DIAGNOSIS — Z82.49 FAMILY HISTORY OF ISCHEMIC HEART DISEASE: ICD-10-CM

## 2022-02-04 DIAGNOSIS — Z00.00 ROUTINE GENERAL MEDICAL EXAMINATION AT A HEALTH CARE FACILITY: Primary | ICD-10-CM

## 2022-02-04 DIAGNOSIS — Z01.84 IMMUNITY STATUS TESTING: ICD-10-CM

## 2022-02-04 LAB
AMPHETAMINES (AMP) UR: NEGATIVE
BARBITURATES (BAR) UR: NEGATIVE
BENZODIAZEPINES (BZO) UR: NEGATIVE
BUPRENORPHINE (BUP) UR: NEGATIVE
CANNABINOIDS (THC) UR: NEGATIVE
COCAINE (COC) UR: NEGATIVE
MDMA UR: NEGATIVE
METHADONE (MTD) UR: NEGATIVE
METHAMPHETAMINE (METHA) UR: NEGATIVE
MORPH/OPIATES (MOP) UR: NEGATIVE
OXYCODONE (OXYCO) UR: NEGATIVE
PCP UR: NEGATIVE
SPECIMEN VALIDITY INTERNAL QC UR: NORMAL
SPECIMEN VALIDITY TEMPERATURE UR: NORMAL
SPECIMEN VALIDITY UR CREATININE: NORMAL MG/DL (ref 20–200)
SPECIMEN VALIDITY UR PH: 4 (ref 4–9)
SPECIMEN VALIDITY UR SPECIFIC GRAVITY: 1.02 (ref 1–1.02)

## 2022-02-04 PROCEDURE — 99396 PREV VISIT EST AGE 40-64: CPT | Mod: 25 | Performed by: NURSE PRACTITIONER

## 2022-02-04 PROCEDURE — 90471 IMMUNIZATION ADMIN: CPT | Mod: 59 | Performed by: NURSE PRACTITIONER

## 2022-02-04 PROCEDURE — 80306 DRUG TEST PRSMV INSTRMNT: CPT | Performed by: NURSE PRACTITIONER

## 2022-02-04 PROCEDURE — 90715 TDAP VACCINE 7 YRS/> IM: CPT | Performed by: NURSE PRACTITIONER

## 2022-02-04 PROCEDURE — 36415 COLL VENOUS BLD VENIPUNCTURE: CPT | Performed by: NURSE PRACTITIONER

## 2022-02-04 RX ORDER — DEXTROAMPHETAMINE SACCHARATE, AMPHETAMINE ASPARTATE, DEXTROAMPHETAMINE SULFATE AND AMPHETAMINE SULFATE 5; 5; 5; 5 MG/1; MG/1; MG/1; MG/1
20 TABLET ORAL DAILY
Qty: 45 TABLET | Refills: 0 | Status: SHIPPED | OUTPATIENT
Start: 2022-02-04 | End: 2022-04-04

## 2022-02-04 ASSESSMENT — ANXIETY QUESTIONNAIRES
1. FEELING NERVOUS, ANXIOUS, OR ON EDGE: SEVERAL DAYS
IF YOU CHECKED OFF ANY PROBLEMS ON THIS QUESTIONNAIRE, HOW DIFFICULT HAVE THESE PROBLEMS MADE IT FOR YOU TO DO YOUR WORK, TAKE CARE OF THINGS AT HOME, OR GET ALONG WITH OTHER PEOPLE: NOT DIFFICULT AT ALL
7. FEELING AFRAID AS IF SOMETHING AWFUL MIGHT HAPPEN: NOT AT ALL
3. WORRYING TOO MUCH ABOUT DIFFERENT THINGS: NOT AT ALL
2. NOT BEING ABLE TO STOP OR CONTROL WORRYING: NOT AT ALL
5. BEING SO RESTLESS THAT IT IS HARD TO SIT STILL: SEVERAL DAYS
6. BECOMING EASILY ANNOYED OR IRRITABLE: NOT AT ALL
GAD7 TOTAL SCORE: 2

## 2022-02-04 ASSESSMENT — PATIENT HEALTH QUESTIONNAIRE - PHQ9
SUM OF ALL RESPONSES TO PHQ QUESTIONS 1-9: 3
5. POOR APPETITE OR OVEREATING: NOT AT ALL

## 2022-02-04 ASSESSMENT — MIFFLIN-ST. JEOR: SCORE: 2132.66

## 2022-02-04 NOTE — PATIENT INSTRUCTIONS
Preventive Health Recommendations  Male Ages 40 to 49    Yearly exam:             See your health care provider every year in order to  o   Review health changes.   o   Discuss preventive care.    o   Review your medicines if your doctor has prescribed any.    You should be tested each year for STDs (sexually transmitted diseases) if you re at risk.     Have a cholesterol test every 5 years.     Have a colonoscopy (test for colon cancer) if someone in your family has had colon cancer or polyps before age 50.     After age 45, have a diabetes test (fasting glucose). If you are at risk for diabetes, you should have this test every 3 years.      Talk with your health care provider about whether or not a prostate cancer screening test (PSA) is right for you.    Shots: Get a flu shot each year. Get a tetanus shot every 10 years.     Nutrition:    Eat at least 5 servings of fruits and vegetables daily.     Eat whole-grain bread, whole-wheat pasta and brown rice instead of white grains and rice.     Get adequate Calcium and Vitamin D.     Lifestyle    Exercise for at least 150 minutes a week (30 minutes a day, 5 days a week). This will help you control your weight and prevent disease.     Limit alcohol to one drink per day.     No smoking.     Wear sunscreen to prevent skin cancer.     See your dentist every six months for an exam and cleaning.     Thank you for coming in today!     We are working to improve our digital reputation.  Would you please help us by reviewing our clinic on Google and/or Facebook?  These are links for filling out a review for the clinic:    [email or web address]    We truly appreciate you taking the time to do this.     General Information:    Today you had your appointment with Rosalinda Cooper CNP  My hours are:    Monday 8 AM - 5 PM  Wednesday: 8 AM - 5 PM  Thursday: 8 AM - 5 PM  Fridays: 8 AM - 5 PM    I  am not in the office Tuesdays. Therefore non urgent calls received on Tuesday will be addressed when I am back in the office on Wednesday.     If lab work was done today as part of your evaluation you will generally be contacted via My Chart, mail, or phone with the results within 1-5 days. If there is an alarming result we will contact you by phone. Lab results come back at varying times, I generally wait until all labs are resulted before making comments on results. Please note labs are automatically released to My Chart once available.     If you need refills please contact your pharmacy They will send a refill request to me to review. Please allow 3 business days for us to process all refill requests.     Please call or send a medical message with any questions or concerns

## 2022-02-04 NOTE — LETTER
[facility]  02/04/22  Patient: Maico Shen  YOB: 1980  Medical Record Number: [id number]                                                                                  Non-Opioid Controlled Substance Agreement    This is an agreement between you and your provider regarding safe and appropriate use of controlled substances prescribed by your care team. Controlled substances are?medicines that can cause physical and mental dependence (abuse).     There are strict laws about having and using these medicines. We here at [facility] are  committed to working with you in your efforts to get better. To support you in this work, we'll help you schedule regular office appointments for medicine refills. If we must cancel or change your appointment for any reason, we'll make sure you have enough medicine to last until your next appointment.     As a Provider, I will:     Listen carefully to your concerns while treating you with respect.     Recommend a treatment plan that I believe is in your best interest and may involve therapies other than medicine.      Talk with you often about the possible benefits and the risk of harm of any medicine that we prescribe for you.    Assess the safety of this medicine and check how well it works.      Provide a plan on how to taper (discontinue or go off) using this medicine if the decision is made to stop its use.      ::  As a Patient, I understand controlled substances:       Are prescribed by my care provider to help me function or work and manage my condition(s).?    Are strong medicines and can cause serious side effects.       Need to be taken exactly as prescribed.?Combining controlled substances with certain medicines or chemicals (such as illegal drugs, alcohol, sedatives, sleeping pills, and benzodiazepines) can be dangerous or even fatal.? If I stop taking my medicines suddenly, I may have severe withdrawal symptoms.     The risks,  benefits, and side effects of these medicine(s) were explained to me. I agree that:    1. I will take part in other treatments as advised by my care team. This may be psychiatry or counseling, physical therapy, behavioral therapy, group treatment or a referral to specialist.    2. I will keep all my appointments and understand this is part of the monitoring of controlled substances.?My care team may require an office visit for EVERY controlled substance refill. If I miss appointments or don t follow instructions, my care team may stop my medicine    3. I will take my medicines as prescribed. I will not change the dose or schedule unless my care team tells me to. There will be no refills if I run out early.      4. I may be asked to come to the clinic and complete a urine drug test or complete a pill count. If I don t give a urine sample or participate in a pill count, the care team may stop my medicine.    5. I will only receive controlled substance prescriptions from this clinic. If I am treated by another provider, I will tell them that I am taking controlled substances and that I have a treatment agreement with this provider. I will inform my [facility] care team within one business day if I am given a prescription for any controlled substance by another healthcare provider. My [facility] care team can contact other providers and pharmacists about my use of any medicines.    6. It is up to me to make sure that I don't run out of my medicines on weekends or holidays.?If my care team is willing to refill my prescription without a visit, I must request refills only during office hours. Refills may take up to 3 business days to process. I will use one pharmacy to fill all my controlled substance prescriptions. I will notify the clinic about any changes to my insurance or medicine availability.    7. I am responsible for my prescriptions. If the medicine/prescription is lost, stolen or destroyed, it will  not be replaced.?I also agree not to share controlled substance medicines with anyone.     8. I am aware I should not use any illegal or recreational drugs. I agree not to drink alcohol unless my care team says I can.     9. If I enroll in the [place] Medical Cannabis program, I will tell my care team before my next refill.    10. I will tell my care team right away if I become pregnant, have a new medical problem treated outside of my regular clinic, or have a change in my medicines.     11. I understand that this medicine can affect my thinking, judgment and reaction time.? Alcohol and drugs affect the brain and body, which can affect the safety of my driving. Being under the influence of alcohol or drugs can affect my decision-making, behaviors, personal safety and the safety of others. Driving while impaired (DWI) can occur if a person is driving, operating or in physical control of a car, motorcycle, boat, snowmobile, ATV, motorbike, off-road vehicle or any other motor vehicle ([place] Statute 169[place].20). I understand the risk if I choose to drive or operate any vehicle or machinery.    I understand that if I do not follow any of the conditions above, my prescriptions or treatment may be stopped or changed.   I agree that my provider, clinic care team and pharmacy may work with any city, state or federal law enforcement agency that investigates the misuse, sale or other diversion of my controlled medicine. I will allow my provider to discuss my care with, or share a copy of, this agreement with any other treating provider, pharmacy or emergency room where I receive care.     I have read this agreement and have asked questions about anything I did not understand.    ________________________________________________________  Patient Signature - Maico Shen     ___________________                   Date     ________________________________________________________  Provider Signature - JESIKA Arnold CNP        ___________________                   Date     ________________________________________________________  Witness Signature (required if provider not present while patient signing)          ___________________                   Date

## 2022-02-05 ASSESSMENT — ANXIETY QUESTIONNAIRES: GAD7 TOTAL SCORE: 2

## 2022-02-06 LAB
ALBUMIN SERPL-MCNC: 4.5 G/DL (ref 4–5)
ALBUMIN/GLOB SERPL: 1.6 {RATIO} (ref 1.2–2.2)
ALP SERPL-CCNC: 94 IU/L (ref 44–121)
ALT SERPL-CCNC: 24 IU/L (ref 0–44)
AST SERPL-CCNC: 19 IU/L (ref 0–40)
BILIRUB SERPL-MCNC: <0.2 MG/DL (ref 0–1.2)
BUN SERPL-MCNC: 18 MG/DL (ref 6–24)
BUN/CREATININE RATIO: 21 (ref 9–20)
CALCIUM SERPL-MCNC: 9.5 MG/DL (ref 8.7–10.2)
CHLORIDE SERPLBLD-SCNC: 104 MMOL/L (ref 96–106)
CHOLEST SERPL-MCNC: 242 MG/DL (ref 100–199)
CREAT SERPL-MCNC: 0.84 MG/DL (ref 0.76–1.27)
EGFR IF AFRICN AM: 125 ML/MIN/1.73
EGFR IF NONAFRICN AM: 108 ML/MIN/1.73
GLOBULIN, TOTAL: 2.9 G/DL (ref 1.5–4.5)
GLUCOSE SERPL-MCNC: 102 MG/DL (ref 65–99)
HBA1C MFR BLD: 5.8 % (ref 4.8–5.6)
HCV AB SERPL QL IA: <0.1 S/CO RATIO (ref 0–0.9)
HDLC SERPL-MCNC: 41 MG/DL
HEP A AB, TOTAL: POSITIVE
HEP B SURFACE ABY QUAL: REACTIVE
LDL/HDL RATIO: 4.1 RATIO (ref 0–3.6)
LDLC SERPL CALC-MCNC: 167 MG/DL (ref 0–99)
POTASSIUM SERPL-SCNC: 4.9 MMOL/L (ref 3.5–5.2)
PROT SERPL-MCNC: 7.4 G/DL (ref 6–8.5)
SODIUM SERPL-SCNC: 139 MMOL/L (ref 134–144)
TOTAL CO2: 20 MMOL/L (ref 20–29)
TRIGL SERPL-MCNC: 182 MG/DL (ref 0–149)
VLDLC SERPL CALC-MCNC: 34 MG/DL (ref 5–40)

## 2022-04-04 ENCOUNTER — MYC REFILL (OUTPATIENT)
Dept: FAMILY MEDICINE | Facility: CLINIC | Age: 42
End: 2022-04-04

## 2022-04-04 DIAGNOSIS — F90.0 ATTENTION DEFICIT HYPERACTIVITY DISORDER (ADHD), PREDOMINANTLY INATTENTIVE TYPE: ICD-10-CM

## 2022-04-05 RX ORDER — DEXTROAMPHETAMINE SACCHARATE, AMPHETAMINE ASPARTATE, DEXTROAMPHETAMINE SULFATE AND AMPHETAMINE SULFATE 5; 5; 5; 5 MG/1; MG/1; MG/1; MG/1
20 TABLET ORAL DAILY
Qty: 45 TABLET | Refills: 0 | Status: SHIPPED | OUTPATIENT
Start: 2022-04-05 | End: 2022-06-03

## 2022-04-05 NOTE — TELEPHONE ENCOUNTER
Last  OV on 02/04/22 with Norma for routine exam.  Toxasure and signed agreement done on same date.    Per  last filled for # 45 on 02/05, 12/01, 08/24

## 2022-06-03 ENCOUNTER — MYC REFILL (OUTPATIENT)
Dept: FAMILY MEDICINE | Facility: CLINIC | Age: 42
End: 2022-06-03

## 2022-06-03 DIAGNOSIS — F90.0 ATTENTION DEFICIT HYPERACTIVITY DISORDER (ADHD), PREDOMINANTLY INATTENTIVE TYPE: ICD-10-CM

## 2022-06-03 RX ORDER — DEXTROAMPHETAMINE SACCHARATE, AMPHETAMINE ASPARTATE, DEXTROAMPHETAMINE SULFATE AND AMPHETAMINE SULFATE 5; 5; 5; 5 MG/1; MG/1; MG/1; MG/1
20 TABLET ORAL DAILY
Qty: 45 TABLET | Refills: 0 | Status: SHIPPED | OUTPATIENT
Start: 2022-06-03 | End: 2022-07-27

## 2022-06-03 NOTE — TELEPHONE ENCOUNTER
Patient is requesting refill on generic Adderall 20mg #45 si tab q AM and 1/2 tab q afternoon if needed.     Last related visit: 22 cpx with Rosalinda Cooper CNP     Controlled Substance Agreement: 22  Urine tox screen: 22 negative     [place]  - fills [place] substances for past year:         Plan: routed refill request to Rosalinda Cooper CNP.

## 2022-07-27 ENCOUNTER — MYC REFILL (OUTPATIENT)
Dept: FAMILY MEDICINE | Facility: CLINIC | Age: 42
End: 2022-07-27

## 2022-07-27 DIAGNOSIS — F90.0 ATTENTION DEFICIT HYPERACTIVITY DISORDER (ADHD), PREDOMINANTLY INATTENTIVE TYPE: ICD-10-CM

## 2022-07-27 RX ORDER — DEXTROAMPHETAMINE SACCHARATE, AMPHETAMINE ASPARTATE, DEXTROAMPHETAMINE SULFATE AND AMPHETAMINE SULFATE 5; 5; 5; 5 MG/1; MG/1; MG/1; MG/1
20 TABLET ORAL DAILY
Qty: 45 TABLET | Refills: 0 | Status: SHIPPED | OUTPATIENT
Start: 2022-07-27 | End: 2022-09-22

## 2022-07-27 NOTE — TELEPHONE ENCOUNTER
Patient requesting refill on Adderall 20mg #45 si po every day. Take additional 1/2 tab in afternoon if needed.     Last related visit: 22 cpx with Rosalinda Cooper CNP   Due for next med check: 2022   Controlled Substance Agreement: 22  Urine tox screen: 22    [place] PDMP fills:       Plan: routed to Rosalinda Cooper CNP for review.  Gerri Marrufo RN

## 2022-08-20 NOTE — PROGRESS NOTES
"SUBJECTIVE:   CC: Maico Shen is an 41 year old male who presents for preventative health visit.     HPI  SUBJECTIVE:    This 41 year old male presents for a routine preventive physical exam.    The patient has the following concerns:   1. Cholesterol - had raisin bran at 4 or 5 am this morning with banana  2. Calcium scan due to family history of heart disease  3. Vaccines - Tetanus. Wondering if he needs Hepatitis vaccines  4. ADHD - Taking Adderall 20 mg daily, sometimes 10 mg in afternoon if needed. Goes through 45 tabs in about 2 weeks.    Sees dentist every 2-3 years.  Wears contacts but does not see eye doctor regularly    Patient's medications, allergies, past medical, surgical and family histories were reviewed and updated as appropriate.    Health Maintenance  Health maintenance alerts were reviewed and updated as appropriate.  Diet and Exercise: Nutrition plan \"dialed in\". Does not eat meat. Works for [employer]. Exercises doing cardio 3x/week, resistance training 2-3 times/week      Today's PHQ-2 Score:   PHQ-2 ( 1999 Pfizer) 2/4/2022   Q1: Little interest or pleasure in doing things 0   Q2: Feeling down, depressed or hopeless 0   PHQ-2 Score 0   PHQ-2 Total Score (12-17 Years)- Positive if 3 or more points; Administer PHQ-A if positive -       Abuse: Current or Past(Physical, Sexual or Emotional)- No  Do you feel safe in your environment? Yes        Social History     Tobacco Use     Smoking status: Never Smoker     Smokeless tobacco: Never Used   Substance Use Topics     Alcohol use: Yes     Comment: 1-2 days/week, split a bottle of wine     Reviewed orders with patient. Reviewed health maintenance and updated orders accordingly - Yes  Lab work is in process  BP Readings from Last 3 Encounters:   02/04/22 134/84   04/25/19 154/89   04/25/18 139/79    Wt Readings from Last 3 Encounters:   02/04/22 113.1 kg (249 lb 6 oz)   05/29/19 106.6 kg (235 lb)   04/25/18 104.9 kg (231 lb 5 oz)                " "  Patient Active Problem List   Diagnosis     Attention deficit hyperactivity disorder (ADHD), predominantly inattentive type     Controlled substance agreement signed     Past Surgical History:   Procedure Laterality Date     NO HISTORY OF SURGERY         Social History     Tobacco Use     Smoking status: Never Smoker     Smokeless tobacco: Never Used   Substance Use Topics     Alcohol use: Yes     Comment: 1-2 days/week, split a bottle of wine     Family History   Problem Relation Age of Onset     Chronic Obstructive Pulmonary Disease Mother      Asthma Mother      Cervical Cancer Mother      Diabetes Father      Heart Disease Father      Cerebrovascular Disease Father         stroke     No Known Problems Sister      No Known Problems Brother      Breast Cancer Paternal Aunt          Current Outpatient Medications   Medication Sig Dispense Refill     amphetamine-dextroamphetamine (ADDERALL) 20 MG tablet Take 1 tablet (20 mg) by mouth daily Take additional 0.5 tab (10 mg) in the afternoon if needed. 45 tablet 0     No Known Allergies    Reviewed and updated as needed this visit by clinical staff  Tobacco  Allergies  Meds  Problems  Med Hx  Surg Hx  Fam Hx  Soc Hx         Reviewed and updated as needed this visit by Provider  Tobacco  Allergies  Meds  Problems  Med Hx  Surg Hx  Fam Hx  Soc Hx          Review of Systems   ROS: 10 point ROS neg other than the symptoms noted above in the HPI.      OBJECTIVE:   /84   Pulse 82   Resp 16   Ht 1.93 m (6' 4\")   Wt 113.1 kg (249 lb 6 oz)   SpO2 97%   BMI 30.35 kg/m      Physical Exam  GENERAL: healthy, alert and no distress  EYES: Eyes grossly normal to inspection, PERRL and conjunctivae and sclerae normal  HENT: ear canals and TM's normal, nose and mouth without ulcers or lesions  NECK: no adenopathy, no asymmetry, masses, or scars and thyroid normal to palpation  RESP: lungs clear to auscultation - no rales, rhonchi or wheezes  CV: regular rate " and rhythm, normal S1 S2, no S3 or S4, no murmur, click or rub, no peripheral edema and peripheral pulses strong  ABDOMEN: soft, nontender, no hepatosplenomegaly, no masses and bowel sounds normal  MS: extremities normal- no gross deformities noted  SKIN: raised papule lateral corner of right eye  NEURO: Normal strength and tone, mentation intact and speech normal  PSYCH: normal affect & mood    ASSESSMENT/PLAN:   Maico was seen today for recheck medication and imm/inj.    Diagnoses and all orders for this visit:    Routine general medical examination at a health care facility  Age-appropriate preventative health maintenance along with diet, exercise and healthy weight discussed. Tdap given today. Has had Covid booster but unsure of date    Advanced care planning/counseling discussion  Thinks that he and his wife have this done. Will bring copy to clinic to scan    Attention deficit hyperactivity disorder (ADHD), predominantly inattentive type  -     amphetamine-dextroamphetamine (ADDERALL) 20 MG tablet; Take 1 tablet (20 mg) by mouth daily Take additional 0.5 tab (10 mg) in the afternoon if needed.  Reviewed the status of his AD(H)D management status at length. He is satisfied with his current treatment including performance in work Discussed specific treatment including their possible side effects and the fact that they are controlled substances which require special handling of prescriptions and our need for close monitoring including regular follow up. He desires to continue the current medication and agrees to the current schedule for visits every 6 month(s).  I reminded him that any noncompliance with regard to prescriptions or follow up may result in my declining to provide further refills of these medications.  Controlled substance agreement signed today and urine tox screening done.     Controlled substance agreement signed  -     UScreen Toxisure ([facility])    Family history of ischemic heart disease  -      "Referral to [facility]; Future    Immunity status testing  -     Hep A Ab  Total ([facility])  -     Hep B Surface Ab ([facility])    Screening for diabetes mellitus  -     Comp. Metabolic Panel (14) ([facility]); Future  -     Hemoglobin A1C ([facility]); Future  -     VENOUS COLLECTION  -     Hemoglobin A1C ([facility])  -     Comp. Metabolic Panel (14) ([facility])    Need for Tdap vaccination  -     TDAP (ADACEL,BOOSTRIX)  -     VACCINE ADMINISTRATION [facility], EACH ADDITIONAL    Lipid screening  -     Lipid Panel ([facility]); Future  -     VENOUS COLLECTION  -     Lipid Panel ([facility])    Encounter for HCV screening test for low risk patient  -     HCV Antibody ([facility])  -     VENOUS COLLECTION        COUNSELING:   Reviewed preventive health counseling, as reflected in patient instructions  Special attention given to:        Regular exercise       Healthy diet/nutrition       Immunizations    Vaccinated for: TDAP         Advance Care Planning  Should schedule vision & dental exams    Estimated body mass index is 30.35 kg/m  as calculated from the following:    Height as of this encounter: 1.93 m (6' 4\").    Weight as of this encounter: 113.1 kg (249 lb 6 oz).     Weight management plan: Discussed healthy diet and exercise guidelines    He reports that he has never smoked. He has never used smokeless tobacco.        JESIKA Arnold [facility]            " denies pain/discomfort

## 2022-09-11 ENCOUNTER — HEALTH MAINTENANCE LETTER (OUTPATIENT)
Age: 42
End: 2022-09-11

## 2022-09-22 ENCOUNTER — VIRTUAL VISIT (OUTPATIENT)
Dept: FAMILY MEDICINE | Facility: CLINIC | Age: 42
End: 2022-09-22

## 2022-09-22 DIAGNOSIS — F90.0 ATTENTION DEFICIT HYPERACTIVITY DISORDER (ADHD), PREDOMINANTLY INATTENTIVE TYPE: ICD-10-CM

## 2022-09-22 PROCEDURE — 99213 OFFICE O/P EST LOW 20 MIN: CPT | Mod: 95 | Performed by: NURSE PRACTITIONER

## 2022-09-22 RX ORDER — DEXTROAMPHETAMINE SACCHARATE, AMPHETAMINE ASPARTATE, DEXTROAMPHETAMINE SULFATE AND AMPHETAMINE SULFATE 5; 5; 5; 5 MG/1; MG/1; MG/1; MG/1
20 TABLET ORAL DAILY
Qty: 45 TABLET | Refills: 0 | Status: SHIPPED | OUTPATIENT
Start: 2022-09-22 | End: 2022-11-16

## 2022-09-22 NOTE — PROGRESS NOTES
Problem(s) Oriented visit      Video-Visit Details    Type of service:  Video Visit    Video Start Time (time video started): 1030    Video End Time (time video stopped): 1037    Originating Location (pt. Location): Home    Distant Location (provider location):  [facility]     Mode of Communication:  Video Conference via [facility]    Physician has received verbal consent for a Video Visit from the patient? Yes    This was a virtual video-visit conducted during COVID-19 outbreak in regulation with social distancing and quarantine recommendations of the CDC and [place] department of health and human services. A two way audio/video connection was used in real time with patient's consent.    JESIKA Arnold CNP    SUBJECTIVE:                                                    Maico Shen is a 42 year old male who presents to clinic today for the following health issues :    Taking Adderall for ADHD. Takes 20 mg daily during the week with 10 mg in afternoon as needed. Only takes on weekend as needed. 45 tabs of 20 md Adderall generally lasts him about 1.5-2 months. Denies appetite suppression, difficulty sleeping. Work going well. No concerns.    Problem list, Medication list, Allergies, and Medical/Social/Surgical histories reviewed in [facility] and updated as appropriate.   Additional history: as documented    ROS:  Taking Adderall for ADHD. Takes 20 mg daily during the week with 10 mg in afternoon as needed. Only takes on weekend as needed. 45 tabs of 20 md Adderall generally lasts him about 2 months. Denies appetite suppression, difficulty sleeping. Work going well. No concerns.    OBJECTIVE:                                                    No vitals taken due to telehealth visit     APPEARANCE: = Relaxed and in no distress  Mood/Affect = Cooperative and interested     ASSESSMENT/PLAN:                                                      Maico was seen today for refill request.    Diagnoses and  all orders for this visit:    Attention deficit hyperactivity disorder (ADHD), predominantly inattentive type  -     amphetamine-dextroamphetamine (ADDERALL) 20 MG tablet; Take 1 tablet (20 mg) by mouth daily Take additional 0.5 tab (10 mg) in the afternoon if needed.    Reviewed the status of his ADHD management status at length. he is satisfied with his current treatment including performance in work. Discussed specific treatment including their possible side effects and the fact that they are controlled substances which require special handling of prescriptions and our need for close monitoring including regular follow up.    he desires to continue the current medication and agrees to the current schedule for visits every 6 month(s).  I reminded him that any noncompliance with regard to prescriptions or follow up may result in my declining to provide further refills of these medications.  PDMP Review       Value Time User    State PDMP site checked  Yes 9/22/2022 10:27 AM Rosalinda Cooper APRN CNP        See Patient Instructions  Patient Instructions   [facility] - several locations - call them to schedule ct calcium scan     Schedule physical in Feb/March 2023          JESIKA Arnold CNP  [facility]  Family Practice  [facility]  [phone]    For any issues my office # is [phone]

## 2022-09-22 NOTE — PATIENT INSTRUCTIONS
[facility] - several locations - call them to schedule ct calcium scan     Schedule physical in Feb/March 2023

## 2022-10-25 ENCOUNTER — ANCILLARY PROCEDURE (OUTPATIENT)
Dept: GENERAL RADIOLOGY | Facility: CLINIC | Age: 42
End: 2022-10-25
Attending: FAMILY MEDICINE
Payer: COMMERCIAL

## 2022-10-25 ENCOUNTER — OFFICE VISIT (OUTPATIENT)
Dept: ORTHOPEDICS | Facility: CLINIC | Age: 42
End: 2022-10-25
Payer: COMMERCIAL

## 2022-10-25 VITALS — WEIGHT: 249 LBS | HEIGHT: 76 IN | BODY MASS INDEX: 30.32 KG/M2

## 2022-10-25 DIAGNOSIS — M25.522 LEFT ELBOW PAIN: ICD-10-CM

## 2022-10-25 DIAGNOSIS — M25.522 LEFT ELBOW PAIN: Primary | ICD-10-CM

## 2022-10-25 PROCEDURE — 99203 OFFICE O/P NEW LOW 30 MIN: CPT | Performed by: FAMILY MEDICINE

## 2022-10-25 PROCEDURE — 73080 X-RAY EXAM OF ELBOW: CPT | Mod: LT | Performed by: RADIOLOGY

## 2022-10-25 NOTE — PATIENT INSTRUCTIONS
You may do the stretching exercises right away. You may do the strengthening exercises when stretching is nearly painless.    STRETCHING EXERCISES    Wrist active range of motion, flexion and extension: Bend the wrist of your injured arm forward and back as far as you can. Do 2 sets of 15.    Forearm pronation and supination: Bend the elbow of your injured arm 90 degrees, keeping your elbow at your side. Turn your palm up and hold for 5 seconds. Then slowly turn your palm down and hold for 5 seconds. Make sure you keep your elbow at your side and bent 90 degrees while you do the exercise. Do 2 sets of 15.    Active elbow flexion and extension: Gently bring the palm of the hand on your injured side up toward your shoulder, bending your elbow as much as you can. Then straighten your elbow as far as you can. Repeat 15 times. Do 2 sets of 15.    STRENGTHENING EXERCISES    Wrist flexion: Hold a can or hammer handle in your hand with your palm facing up. Bend your wrist upward. Slowly lower the weight and return to the starting position. Do 2 sets of 15. Gradually increase the weight of the can or weight you are holding.    Wrist extension: Hold a soup can or hammer handle in your hand with your palm facing down. Slowly bend your wrist up. Slowly lower the weight down into the starting position. Do 2 sets of 15. Gradually increase the weight of the object you are holding.    Wrist radial deviation strengthening: Put your wrist in the sideways position with your thumb up. Hold a can of soup or a hammer handle and gently bend your wrist up, with the thumb reaching toward the ceiling. Slowly lower to the starting position. Do not move your forearm throughout this exercise. Do 2 sets of 15.    Forearm pronation and supination strengthening: Hold a soup can or hammer handle in your hand and bend your elbow 90 degrees. Slowly turn your hand so your palm is up and then down. Do 2 sets of 15.    Wrist extension with broom handle:  Stand up and hold a broom handle in both hands. With your arms at shoulder level, elbows straight and palms down, roll the broom handle backward in your hand. Do 2 sets of 15.

## 2022-10-25 NOTE — PROGRESS NOTES
CHIEF COMPLAINT:  Pain of the Left Elbow       HISTORY OF PRESENT ILLNESS  Mr. Shen is a pleasant 42 year old year old male who presents to clinic today with left elbow pain.  Maico explains that he was lifting up his kayak yesterday and felt/heard a pop in his left anterior elbow.   No significant bruising present in last 24 hours but pain intermittently with movement.    He notes a similar incident in 2021 lifting a different object and took many months to heal.    Onset: sudden  Location: left elbow  Quality:  stabbing  Duration: 1 days   Severity: 7/10 at worst  Timing:intermittent episodes pronation/supination, other random movements  Modifying factors:  resting and non-use makes it better, movement and use makes it worse  Associated signs & symptoms: slight swelling  Previous similar pain: Yes, Similar issue contralaterally in 2021  Treatments to date: compression sleeve    Additional history: as documented    Review of Systems:    Have you recently had a a fever, chills, weight loss? No    Do you have any vision problems? No    Do you have any chest pain or edema? No    Do you have any shortness of breath or wheezing?  No    Do you have stomach problems? No    Do you have any numbness or focal weakness? No    Do you have diabetes? No    Do you have problems with bleeding or clotting? No    Do you have an rashes or other skin lesions? No    MEDICAL HISTORY  Patient Active Problem List   Diagnosis     Attention deficit hyperactivity disorder (ADHD), predominantly inattentive type     Controlled substance agreement signed       Current Outpatient Medications   Medication Sig Dispense Refill     amphetamine-dextroamphetamine (ADDERALL) 20 MG tablet Take 1 tablet (20 mg) by mouth daily Take additional 0.5 tab (10 mg) in the afternoon if needed. 45 tablet 0       No Known Allergies    Family History   Problem Relation Age of Onset     Chronic Obstructive Pulmonary Disease Mother      Asthma Mother       "Cervical Cancer Mother      Diabetes Father      Heart Disease Father      Cerebrovascular Disease Father         stroke     No Known Problems Sister      No Known Problems Brother      Breast Cancer Paternal Aunt        Additional medical/Social/Surgical histories reviewed in [facility] and updated as appropriate.       PHYSICAL EXAM  Ht 1.93 m (6' 4\")   Wt 112.9 kg (249 lb)   BMI 30.31 kg/m      General  - normal appearance, in no obvious distress  Musculoskeletal - left elbow  - inspection: normal joint alignment, no obvious deformity, no swelling  - palpation: no bony or soft tissue tenderness  - ROM:  160 deg flexion   0 deg extension   90 deg pronation   90 deg supination  - strength: 5/5  strength, 5/5 flexion, extension, 4/5 painful resisted supination and pronation.   - special tests:  (-) varus  (-) valgus  (-) Tinel's  (-) Hook Test  Neuro  - no sensory or motor deficit, grossly normal coordination, normal muscle tone  Skin  - no ecchymosis, erythema, warmth, or induration, no obvious rash  Psych  - interactive, appropriate, normal mood and affect    IMAGING : XR elbow left 3V. Final results and radiologist's interpretation, available in the [facility] health record. Images were reviewed with the patient/family members in the office today. My personal interpretation of the performed imaging is no acute osseous abnormality or significant degenerative changes of joint.       ASSESSMENT & PLAN  Mr. Shen is a 42 year old year old male who presents to clinic today with acute left elbow pain after lifting a Kayak in his garage yesterday.    Overall reassuring examination, ROM full and negative hook test, no evidence for collateral ligament injury.  Concern for possible biceps tendon strain, flexor pronator injury.XR without underlying loose body or DJD.    Diagnosis:Acute pain of left elbow.    - Discussed ice, rest and NSAID used  - Referral for OT to address elbow pain if persisting in 1 week  - Activity " modifications provided  - HEP provided for gentle elbow strengthening and ROM  - Follow up 1 month    It was a pleasure seeing Maico today.    Imtiaz Robles DO, CAQSM  Primary Care Sports Medicine

## 2022-10-25 NOTE — LETTER
10/25/2022      RE: Maico Shen  [address]  [facility] [address]     Dear Colleague,    Thank you for referring your patient, Maico Shen, to the [facility] [place]. Please see a copy of my visit note below.    CHIEF COMPLAINT:  Pain of the Left Elbow       HISTORY OF PRESENT ILLNESS  Mr. Shen is a pleasant 42 year old year old male who presents to clinic today with left elbow pain.  Maico explains that he was lifting up his kayak yesterday and felt/heard a pop in his left anterior elbow.   No significant bruising present in last 24 hours but pain intermittently with movement.    He notes a similar incident in 2021 lifting a different object and took many months to heal.    Onset: sudden  Location: left elbow  Quality:  stabbing  Duration: 1 days   Severity: 7/10 at worst  Timing:intermittent episodes pronation/supination, other random movements  Modifying factors:  resting and non-use makes it better, movement and use makes it worse  Associated signs & symptoms: slight swelling  Previous similar pain: Yes, Similar issue contralaterally in 2021  Treatments to date: compression sleeve    Additional history: as documented    Review of Systems:    Have you recently had a a fever, chills, weight loss? No    Do you have any vision problems? No    Do you have any chest pain or edema? No    Do you have any shortness of breath or wheezing?  No    Do you have stomach problems? No    Do you have any numbness or focal weakness? No    Do you have diabetes? No    Do you have problems with bleeding or clotting? No    Do you have an rashes or other skin lesions? No    MEDICAL HISTORY  Patient Active Problem List   Diagnosis     Attention deficit hyperactivity disorder (ADHD), predominantly inattentive type     Controlled substance agreement signed       Current Outpatient Medications   Medication Sig Dispense Refill     amphetamine-dextroamphetamine (ADDERALL) 20 MG  "tablet Take 1 tablet (20 mg) by mouth daily Take additional 0.5 tab (10 mg) in the afternoon if needed. 45 tablet 0       No Known Allergies    Family History   Problem Relation Age of Onset     Chronic Obstructive Pulmonary Disease Mother      Asthma Mother      Cervical Cancer Mother      Diabetes Father      Heart Disease Father      Cerebrovascular Disease Father         stroke     No Known Problems Sister      No Known Problems Brother      Breast Cancer Paternal Aunt        Additional medical/Social/Surgical histories reviewed in [facility] and updated as appropriate.       PHYSICAL EXAM  Ht 1.93 m (6' 4\")   Wt 112.9 kg (249 lb)   BMI 30.31 kg/m      General  - normal appearance, in no obvious distress  Musculoskeletal - left elbow  - inspection: normal joint alignment, no obvious deformity, no swelling  - palpation: no bony or soft tissue tenderness  - ROM:  160 deg flexion   0 deg extension   90 deg pronation   90 deg supination  - strength: 5/5  strength, 5/5 flexion, extension, 4/5 painful resisted supination and pronation.   - special tests:  (-) varus  (-) valgus  (-) Tinel's  (-) Hook Test  Neuro  - no sensory or motor deficit, grossly normal coordination, normal muscle tone  Skin  - no ecchymosis, erythema, warmth, or induration, no obvious rash  Psych  - interactive, appropriate, normal mood and affect    IMAGING : XR elbow left 3V. Final results and radiologist's interpretation, available in the [facility] health record. Images were reviewed with the patient/family members in the office today. My personal interpretation of the performed imaging is no acute osseous abnormality or significant degenerative changes of joint.       ASSESSMENT & PLAN  Mr. Shen is a 42 year old year old male who presents to clinic today with acute left elbow pain after lifting a Kayak in his garage yesterday.    Overall reassuring examination, ROM full and negative hook test, no evidence for collateral ligament injury.  " Concern for possible biceps tendon strain, flexor pronator injury.XR without underlying loose body or DJD.    Diagnosis:Acute pain of left elbow.    - Discussed ice, rest and NSAID used  - Referral for OT to address elbow pain if persisting in 1 week  - Activity modifications provided  - HEP provided for gentle elbow strengthening and ROM  - Follow up 1 month    It was a pleasure seeing Maico today.    Imtiaz Robles DO, CAM  Primary Care Sports Medicine  Imtiaz Robles DO

## 2022-11-16 DIAGNOSIS — F90.0 ATTENTION DEFICIT HYPERACTIVITY DISORDER (ADHD), PREDOMINANTLY INATTENTIVE TYPE: ICD-10-CM

## 2022-11-16 RX ORDER — DEXTROAMPHETAMINE SACCHARATE, AMPHETAMINE ASPARTATE, DEXTROAMPHETAMINE SULFATE AND AMPHETAMINE SULFATE 5; 5; 5; 5 MG/1; MG/1; MG/1; MG/1
20 TABLET ORAL DAILY
Qty: 45 TABLET | Refills: 0 | Status: SHIPPED | OUTPATIENT
Start: 2022-11-16 | End: 2023-02-01

## 2022-11-16 NOTE — TELEPHONE ENCOUNTER
Patient sent message requesting refill on his Adderall 20mg #45.   Last related visit: 9/22/22 with Rosalinda Cooper CNP with plan for recheck on this issue in 6 months.     Per Norma Cooper's visit note:   Taking Adderall for ADHD. Takes 20 mg daily during the week with 10 mg in afternoon as needed. Only takes on weekend as needed. 45 tabs of 20 mg Adderall generally lasts him about 2 months.    Controlled Substance Agreement: 2/4/22  Urine drug screen: 2/4/22    [place] :           Plan: routed request to Rosalinda Cooper CNP for review.  Gerri Marrufo RN

## 2023-02-01 ENCOUNTER — MYC REFILL (OUTPATIENT)
Dept: FAMILY MEDICINE | Facility: CLINIC | Age: 43
End: 2023-02-01

## 2023-02-01 DIAGNOSIS — F90.0 ATTENTION DEFICIT HYPERACTIVITY DISORDER (ADHD), PREDOMINANTLY INATTENTIVE TYPE: ICD-10-CM

## 2023-02-01 RX ORDER — DEXTROAMPHETAMINE SACCHARATE, AMPHETAMINE ASPARTATE, DEXTROAMPHETAMINE SULFATE AND AMPHETAMINE SULFATE 5; 5; 5; 5 MG/1; MG/1; MG/1; MG/1
20 TABLET ORAL DAILY
Qty: 45 TABLET | Refills: 0 | Status: SHIPPED | OUTPATIENT
Start: 2023-02-01 | End: 2023-03-20

## 2023-03-20 ENCOUNTER — MYC REFILL (OUTPATIENT)
Dept: FAMILY MEDICINE | Facility: CLINIC | Age: 43
End: 2023-03-20

## 2023-03-20 DIAGNOSIS — F90.0 ATTENTION DEFICIT HYPERACTIVITY DISORDER (ADHD), PREDOMINANTLY INATTENTIVE TYPE: ICD-10-CM

## 2023-03-20 RX ORDER — DEXTROAMPHETAMINE SACCHARATE, AMPHETAMINE ASPARTATE, DEXTROAMPHETAMINE SULFATE AND AMPHETAMINE SULFATE 5; 5; 5; 5 MG/1; MG/1; MG/1; MG/1
20 TABLET ORAL DAILY
Qty: 45 TABLET | Refills: 0 | Status: SHIPPED | OUTPATIENT
Start: 2023-03-20 | End: 2023-05-22

## 2023-03-20 NOTE — TELEPHONE ENCOUNTER
Bgiftyt refill request for Adderall IR 20mg, sig 1 tab QAM may take and additional 0.5tab in PM if needed #45. Last office visit 9/22/22, patient due for 6 month medication check now, [facility] message sent to patient to call clinic and schedule. CSA signed and tox screen done 2/4/2022. [place]. Refill routed to Rosalinda Cooper CNP for review. Melody Sotomayor

## 2023-05-06 ENCOUNTER — HEALTH MAINTENANCE LETTER (OUTPATIENT)
Age: 43
End: 2023-05-06

## 2023-05-22 ENCOUNTER — OFFICE VISIT (OUTPATIENT)
Dept: FAMILY MEDICINE | Facility: CLINIC | Age: 43
End: 2023-05-22

## 2023-05-22 VITALS
HEIGHT: 74 IN | DIASTOLIC BLOOD PRESSURE: 94 MMHG | WEIGHT: 250.2 LBS | SYSTOLIC BLOOD PRESSURE: 149 MMHG | HEART RATE: 71 BPM | OXYGEN SATURATION: 99 % | BODY MASS INDEX: 32.11 KG/M2

## 2023-05-22 DIAGNOSIS — Z79.899 CONTROLLED SUBSTANCE AGREEMENT SIGNED: ICD-10-CM

## 2023-05-22 DIAGNOSIS — G47.00 INSOMNIA, UNSPECIFIED TYPE: ICD-10-CM

## 2023-05-22 DIAGNOSIS — F90.0 ATTENTION DEFICIT HYPERACTIVITY DISORDER (ADHD), PREDOMINANTLY INATTENTIVE TYPE: Primary | ICD-10-CM

## 2023-05-22 LAB
AMPHETAMINES (AMP) UR: NORMAL
BARBITURATES (BAR) UR: NEGATIVE
BENZODIAZEPINES (BZO) UR: NEGATIVE
BUPRENORPHINE (BUP) UR: NEGATIVE
CANNABINOIDS (THC) UR: NORMAL
COCAINE (COC) UR: NEGATIVE
MDMA UR: NEGATIVE
METHADONE (MTD) UR: NEGATIVE
METHAMPHETAMINE (METHA) UR: NEGATIVE
MORPH/OPIATES (MOP) UR: NEGATIVE
OXYCODONE (OXYCO) UR: NEGATIVE
PCP UR: NEGATIVE
SPECIMEN VALIDITY INTERNAL QC UR: NORMAL
SPECIMEN VALIDITY TEMPERATURE UR: NORMAL
SPECIMEN VALIDITY UR CREATININE: NORMAL MG/DL (ref 20–200)
SPECIMEN VALIDITY UR PH: 4 (ref 4–9)
SPECIMEN VALIDITY UR SPECIFIC GRAVITY: 1.02 (ref 1–1.02)

## 2023-05-22 PROCEDURE — 99213 OFFICE O/P EST LOW 20 MIN: CPT

## 2023-05-22 PROCEDURE — 80306 DRUG TEST PRSMV INSTRMNT: CPT

## 2023-05-22 RX ORDER — TRAZODONE HYDROCHLORIDE 50 MG/1
50 TABLET, FILM COATED ORAL AT BEDTIME
Qty: 30 TABLET | Refills: 0 | Status: SHIPPED | OUTPATIENT
Start: 2023-05-22 | End: 2023-07-24

## 2023-05-22 RX ORDER — DEXTROAMPHETAMINE SACCHARATE, AMPHETAMINE ASPARTATE, DEXTROAMPHETAMINE SULFATE AND AMPHETAMINE SULFATE 5; 5; 5; 5 MG/1; MG/1; MG/1; MG/1
20 TABLET ORAL DAILY
Qty: 45 TABLET | Refills: 0 | Status: SHIPPED | OUTPATIENT
Start: 2023-05-22 | End: 2024-01-18

## 2023-05-22 NOTE — PROGRESS NOTES
"  Assessment & Plan     (F90.0) Attention deficit hyperactivity disorder (ADHD), predominantly inattentive type  (primary encounter diagnosis)  Comment:   Plan: ToxASSURE Urine Drug Screen ([facility])            (Z79.899) Controlled substance agreement signed  Comment:   Plan: ToxASSURE Urine Drug Screen ([facility])  Cannabis and amphetamines positive on tox screen. No change to treatment regimen. Will discuss cannabis use at follow up visit.           Insomnia   -will trial trazadone   -this has been ongoing for patient's entire life, even without taking  adderall so do not believe this to be related and do not need to change  dosing.     Prescription drug management         BMI:   Estimated body mass index is 31.91 kg/m  as calculated from the following:    Height as of this encounter: 1.886 m (6' 2.25\").    Weight as of this encounter: 113.5 kg (250 lb 3.2 oz).       MEDICATIONS:  Continue current medications without change    Return in about 6 months (around 11/22/2023) for Follow up.    JESIKA Hart [facility]    Marcelino Nina is a 42 year old, presenting for the following health issues:  Recheck Medication (Adderall )    HPI     ADHD    Patient denies any side effects, eating well, work is going well.     Patient fluctuates between maybe 5-7 hours of sleep per night this has been ongoing patients entire life and he still has this issue even when not taking adderall.   Takes blood pressure at home 135/75 at home and resting heart rate in 50s. He has had a couple physical injuries and has not been working out as much but slowly getting back into.   Patient does work out about 3-4 days per week.   Stops drinking caffeine around 12-1pm.     STOP-BANG: Low risk  Mallampati Score: Class I    Review of Systems   Constitutional, HEENT, cardiovascular, pulmonary, gi and gu systems are negative, except as otherwise noted.      Objective    BP (!) 149/94   Pulse 71   Ht 1.886 m (6' 2.25\")   " Wt 113.5 kg (250 lb 3.2 oz)   SpO2 99%   BMI 31.91 kg/m    Body mass index is 31.91 kg/m .  Physical Exam   GENERAL: healthy, alert and no distress  NECK: no adenopathy, no asymmetry, masses, or scars and thyroid normal to palpation  RESP: lungs clear to auscultation - no rales, rhonchi or wheezes  CV: regular rate and rhythm, normal S1 S2, no S3 or S4, no murmur, click or rub, no peripheral edema and peripheral pulses strong  MS: no gross musculoskeletal defects noted, no edema

## 2023-05-22 NOTE — LETTER
[facility]  05/22/23  Patient: Maico Shen  YOB: 1980  Medical Record Number: [id number]                                                                                  Non-Opioid Controlled Substance Agreement    This is an agreement between you and your provider regarding safe and appropriate use of controlled substances prescribed by your care team. Controlled substances are?medicines that can cause physical and mental dependence (abuse).     There are strict laws about having and using these medicines. We here at [facility] are  committed to working with you in your efforts to get better. To support you in this work, we'll help you schedule regular office appointments for medicine refills. If we must cancel or change your appointment for any reason, we'll make sure you have enough medicine to last until your next appointment.     As a Provider, I will:   Listen carefully to your concerns while treating you with respect.   Recommend a treatment plan that I believe is in your best interest and may involve therapies other than medicine.    Talk with you often about the possible benefits and the risk of harm of any medicine that we prescribe for you.  Assess the safety of this medicine and check how well it works.    Provide a plan on how to taper (discontinue or go off) using this medicine if the decision is made to stop its use.      ::  As a Patient, I understand controlled substances:     Are prescribed by my care provider to help me function or work and manage my condition(s).?  Are strong medicines and can cause serious side effects.     Need to be taken exactly as prescribed.?Combining controlled substances with certain medicines or chemicals (such as illegal drugs, alcohol, sedatives, sleeping pills, and benzodiazepines) can be dangerous or even fatal.? If I stop taking my medicines suddenly, I may have severe withdrawal symptoms.     The risks, benefits, and side  effects of these medicine(s) were explained to me. I agree that:    I will take part in other treatments as advised by my care team. This may be psychiatry or counseling, physical therapy, behavioral therapy, group treatment or a referral to specialist.    I will keep all my appointments and understand this is part of the monitoring of controlled substances.?My care team may require an office visit for EVERY controlled substance refill. If I miss appointments or don t follow instructions, my care team may stop my medicine    I will take my medicines as prescribed. I will not change the dose or schedule unless my care team tells me to. There will be no refills if I run out early.      I may be asked to come to the clinic and complete a urine drug test or complete a pill count. If I don t give a urine sample or participate in a pill count, the care team may stop my medicine.    I will only receive controlled substance prescriptions from this clinic. If I am treated by another provider, I will tell them that I am taking controlled substances and that I have a treatment agreement with this provider. I will inform my [facility] care team within one business day if I am given a prescription for any controlled substance by another healthcare provider. My [facility] care team can contact other providers and pharmacists about my use of any medicines.    It is up to me to make sure that I don't run out of my medicines on weekends or holidays.?If my care team is willing to refill my prescription without a visit, I must request refills only during office hours. Refills may take up to 3 business days to process. I will use one pharmacy to fill all my controlled substance prescriptions. I will notify the clinic about any changes to my insurance or medicine availability.    I am responsible for my prescriptions. If the medicine/prescription is lost, stolen or destroyed, it will not be replaced.?I also agree not to  share controlled substance medicines with anyone.     I am aware I should not use any illegal or recreational drugs. I agree not to drink alcohol unless my care team says I can.     If I enroll in the [place] Medical Cannabis program, I will tell my care team before my next refill.    I will tell my care team right away if I become pregnant, have a new medical problem treated outside of my regular clinic, or have a change in my medicines.     I understand that this medicine can affect my thinking, judgment and reaction time.? Alcohol and drugs affect the brain and body, which can affect the safety of my driving. Being under the influence of alcohol or drugs can affect my decision-making, behaviors, personal safety and the safety of others. Driving while impaired (DWI) can occur if a person is driving, operating or in physical control of a car, motorcycle, boat, snowmobile, ATV, motorbike, off-road vehicle or any other motor vehicle ([place] Statute 169[place].20). I understand the risk if I choose to drive or operate any vehicle or machinery.    I understand that if I do not follow any of the conditions above, my prescriptions or treatment may be stopped or changed.   I agree that my provider, clinic care team and pharmacy may work with any city, state or federal law enforcement agency that investigates the misuse, sale or other diversion of my controlled medicine. I will allow my provider to discuss my care with, or share a copy of, this agreement with any other treating provider, pharmacy or emergency room where I receive care.     I have read this agreement and have asked questions about anything I did not understand.    ________________________________________________________  Patient Signature - Maico Grossbizach     ___________________                   Date     ________________________________________________________  Provider Signature - JESIKA Hart CNP       ___________________                   Date      ________________________________________________________  Witness Signature (required if provider not present while patient signing)          ___________________                   Date

## 2023-07-06 ENCOUNTER — MYC REFILL (OUTPATIENT)
Dept: FAMILY MEDICINE | Facility: CLINIC | Age: 43
End: 2023-07-06

## 2023-07-06 DIAGNOSIS — F90.0 ATTENTION DEFICIT HYPERACTIVITY DISORDER (ADHD), PREDOMINANTLY INATTENTIVE TYPE: ICD-10-CM

## 2023-07-06 RX ORDER — DEXTROAMPHETAMINE SACCHARATE, AMPHETAMINE ASPARTATE, DEXTROAMPHETAMINE SULFATE AND AMPHETAMINE SULFATE 5; 5; 5; 5 MG/1; MG/1; MG/1; MG/1
20 TABLET ORAL DAILY
Qty: 45 TABLET | Refills: 0 | Status: CANCELLED | OUTPATIENT
Start: 2023-07-06

## 2023-07-07 ENCOUNTER — MYC REFILL (OUTPATIENT)
Dept: FAMILY MEDICINE | Facility: CLINIC | Age: 43
End: 2023-07-07

## 2023-07-07 DIAGNOSIS — F90.0 ATTENTION DEFICIT HYPERACTIVITY DISORDER (ADHD), PREDOMINANTLY INATTENTIVE TYPE: ICD-10-CM

## 2023-07-07 RX ORDER — DEXTROAMPHETAMINE SACCHARATE, AMPHETAMINE ASPARTATE, DEXTROAMPHETAMINE SULFATE AND AMPHETAMINE SULFATE 5; 5; 5; 5 MG/1; MG/1; MG/1; MG/1
20 TABLET ORAL DAILY
Qty: 45 TABLET | Refills: 0 | Status: CANCELLED | OUTPATIENT
Start: 2023-07-07

## 2023-07-10 RX ORDER — DEXTROAMPHETAMINE SACCHARATE, AMPHETAMINE ASPARTATE, DEXTROAMPHETAMINE SULFATE AND AMPHETAMINE SULFATE 5; 5; 5; 5 MG/1; MG/1; MG/1; MG/1
20 TABLET ORAL DAILY
Qty: 45 TABLET | Refills: 0 | Status: SHIPPED | OUTPATIENT
Start: 2023-09-06 | End: 2023-10-06

## 2023-07-10 RX ORDER — DEXTROAMPHETAMINE SACCHARATE, AMPHETAMINE ASPARTATE, DEXTROAMPHETAMINE SULFATE AND AMPHETAMINE SULFATE 5; 5; 5; 5 MG/1; MG/1; MG/1; MG/1
20 TABLET ORAL DAILY
Qty: 45 TABLET | Refills: 0 | Status: SHIPPED | OUTPATIENT
Start: 2023-07-10 | End: 2023-08-09

## 2023-07-10 RX ORDER — DEXTROAMPHETAMINE SACCHARATE, AMPHETAMINE ASPARTATE, DEXTROAMPHETAMINE SULFATE AND AMPHETAMINE SULFATE 5; 5; 5; 5 MG/1; MG/1; MG/1; MG/1
20 TABLET ORAL DAILY
Qty: 45 TABLET | Refills: 0 | Status: SHIPPED | OUTPATIENT
Start: 2023-08-06 | End: 2023-09-05

## 2023-07-24 DIAGNOSIS — G47.00 INSOMNIA, UNSPECIFIED TYPE: ICD-10-CM

## 2023-07-24 NOTE — TELEPHONE ENCOUNTER
Trazodone  LOV 5/22/23  BP Readings from Last 3 Encounters:   05/22/23 (!) 149/94   02/04/22 134/84   04/25/19 154/89

## 2023-07-26 RX ORDER — TRAZODONE HYDROCHLORIDE 50 MG/1
50 TABLET, FILM COATED ORAL AT BEDTIME
Qty: 30 TABLET | Refills: 0 | Status: SHIPPED | OUTPATIENT
Start: 2023-07-26 | End: 2024-02-09

## 2024-01-18 ENCOUNTER — MYC REFILL (OUTPATIENT)
Dept: FAMILY MEDICINE | Facility: CLINIC | Age: 44
End: 2024-01-18

## 2024-01-18 DIAGNOSIS — F90.0 ATTENTION DEFICIT HYPERACTIVITY DISORDER (ADHD), PREDOMINANTLY INATTENTIVE TYPE: ICD-10-CM

## 2024-01-18 RX ORDER — DEXTROAMPHETAMINE SACCHARATE, AMPHETAMINE ASPARTATE, DEXTROAMPHETAMINE SULFATE AND AMPHETAMINE SULFATE 5; 5; 5; 5 MG/1; MG/1; MG/1; MG/1
20 TABLET ORAL DAILY
Qty: 45 TABLET | Refills: 0 | Status: SHIPPED | OUTPATIENT
Start: 2024-01-18 | End: 2024-03-06

## 2024-01-18 NOTE — TELEPHONE ENCOUNTER
Patient sent refill request for Adderall 20mg #45 sig: take one tablet daily; may take additional 1/2 tab in afternoon if needed.   Last related visit: 5/22/23 with Kasey Khanna NP   Future appt: 2/9/24 CPE    Controlled Substance Agreement: 5/22/23    [place] :       Plan: routed request to Dr. Patterson (oncall provider for Kasey Khanna NP). Gerri Marrufo RN

## 2024-02-08 NOTE — PATIENT INSTRUCTIONS
Preventive Care Advice   This is general advice given by our system to help you stay healthy. However, your care team may have specific advice just for you. Please talk to your care team about your preventive care needs.  Nutrition  Eat 5 or more servings of fruits and vegetables each day.  Try wheat bread, brown rice and whole grain pasta (instead of white bread, rice, and pasta).  Get enough calcium and vitamin D. Check the label on foods and aim for 100% of the RDA (recommended daily allowance).  Lifestyle  Exercise at least 150 minutes each week  (30 minutes a day, 5 days a week).  Do muscle strengthening activities 2 days a week. These help control your weight and prevent disease.  No smoking.  Wear sunscreen to prevent skin cancer.  Have a dental exam and cleaning every 6 months.  Yearly exams  See your health care team every year to talk about:  Any changes in your health.  Any medicines your care team has prescribed.  Preventive care, family planning, and ways to prevent chronic diseases.  Shots (vaccines)   HPV shots (up to age 26), if you've never had them before.  Hepatitis B shots (up to age 59), if you've never had them before.  COVID-19 shot: Get this shot when it's due.  Flu shot: Get a flu shot every year.  Tetanus shot: Get a tetanus shot every 10 years.  Pneumococcal, hepatitis A, and RSV shots: Ask your care team if you need these based on your risk.  Shingles shot (for age 50 and up)  General health tests  Diabetes screening:  Starting at age 35, Get screened for diabetes at least every 3 years.  If you are younger than age 35, ask your care team if you should be screened for diabetes.  Cholesterol test: At age 39, start having a cholesterol test every 5 years, or more often if advised.  Bone density scan (DEXA): At age 50, ask your care team if you should have this scan for osteoporosis (brittle bones).  Hepatitis C: Get tested at least once in your life.  STIs (sexually transmitted  infections)  Before age 24: Ask your care team if you should be screened for STIs.  After age 24: Get screened for STIs if you're at risk. You are at risk for STIs (including HIV) if:  You are sexually active with more than one person.  You don't use condoms every time.  You or a partner was diagnosed with a sexually transmitted infection.  If you are at risk for HIV, ask about PrEP medicine to prevent HIV.  Get tested for HIV at least once in your life, whether you are at risk for HIV or not.  Cancer screening tests  Cervical cancer screening: If you have a cervix, begin getting regular cervical cancer screening tests starting at age 21.  Breast cancer scan (mammogram): If you've ever had breasts, begin having regular mammograms starting at age 40. This is a scan to check for breast cancer.  Colon cancer screening: It is important to start screening for colon cancer at age 45.  Have a colonoscopy test every 10 years (or more often if you're at risk) Or, ask your provider about stool tests like a FIT test every year or Cologuard test every 3 years.  To learn more about your testing options, visit:   [email or web address]://[email or web address].pdf.  For help making a decision, visit:   https://bit.ly/bu28933.  Prostate cancer screening test: If you have a prostate, ask your care team if a prostate cancer screening test (PSA) at age 55 is right for you.  Lung cancer screening: If you are a current or former smoker ages 50 to 80, ask your care team if ongoing lung cancer screenings are right for you.  For informational purposes only. Not to replace the advice of your health care provider. Copyright   2023 [employer]. All rights reserved. Clinically reviewed by the [facility] Transitions Program. [employer] [id number] - REV 01/24.

## 2024-02-09 ENCOUNTER — OFFICE VISIT (OUTPATIENT)
Dept: FAMILY MEDICINE | Facility: CLINIC | Age: 44
End: 2024-02-09

## 2024-02-09 VITALS
HEART RATE: 65 BPM | WEIGHT: 253.6 LBS | SYSTOLIC BLOOD PRESSURE: 132 MMHG | DIASTOLIC BLOOD PRESSURE: 78 MMHG | HEIGHT: 75 IN | BODY MASS INDEX: 31.53 KG/M2 | OXYGEN SATURATION: 98 %

## 2024-02-09 DIAGNOSIS — Z13.228 SCREENING FOR ENDOCRINE, METABOLIC AND IMMUNITY DISORDER: ICD-10-CM

## 2024-02-09 DIAGNOSIS — Z00.00 ROUTINE GENERAL MEDICAL EXAMINATION AT A HEALTH CARE FACILITY: Primary | ICD-10-CM

## 2024-02-09 DIAGNOSIS — Z13.29 SCREENING FOR ENDOCRINE, METABOLIC AND IMMUNITY DISORDER: ICD-10-CM

## 2024-02-09 DIAGNOSIS — E78.00 HYPERCHOLESTEROLEMIA: ICD-10-CM

## 2024-02-09 DIAGNOSIS — Z13.0 SCREENING FOR ENDOCRINE, METABOLIC AND IMMUNITY DISORDER: ICD-10-CM

## 2024-02-09 DIAGNOSIS — R73.03 PREDIABETES: ICD-10-CM

## 2024-02-09 LAB
ALBUMIN SERPL BCG-MCNC: 4.8 G/DL (ref 3.5–5.2)
ALP SERPL-CCNC: 114 U/L (ref 40–150)
ALT SERPL W P-5'-P-CCNC: 44 U/L (ref 0–70)
ANION GAP SERPL CALCULATED.3IONS-SCNC: 10 MMOL/L (ref 7–15)
APO A-I SERPL-MCNC: <6 MG/DL
AST SERPL W P-5'-P-CCNC: 28 U/L (ref 0–45)
BILIRUB SERPL-MCNC: 0.6 MG/DL
BUN SERPL-MCNC: 12.3 MG/DL (ref 6–20)
CALCIUM SERPL-MCNC: 10 MG/DL (ref 8.6–10)
CHLORIDE SERPL-SCNC: 102 MMOL/L (ref 98–107)
CHOLESTEROL: 241 MG/DL (ref 100–199)
CREAT SERPL-MCNC: 0.91 MG/DL (ref 0.67–1.17)
DEPRECATED HCO3 PLAS-SCNC: 28 MMOL/L (ref 22–29)
EGFRCR SERPLBLD CKD-EPI 2021: >90 ML/MIN/1.73M2
FASTING?: YES
GLUCOSE SERPL-MCNC: 106 MG/DL (ref 70–99)
HBA1C MFR BLD: 5.7 %
HDL (RMG): 24 MG/DL (ref 40–?)
LDL CALCULATED (RMG): 195 MG/DL (ref 0–130)
POTASSIUM SERPL-SCNC: 5.2 MMOL/L (ref 3.4–5.3)
PROT SERPL-MCNC: 8.3 G/DL (ref 6.4–8.3)
SODIUM SERPL-SCNC: 140 MMOL/L (ref 135–145)
TRIGLYCERIDES (RMG): 106 MG/DL (ref 0–149)

## 2024-02-09 PROCEDURE — 83695 ASSAY OF LIPOPROTEIN(A): CPT

## 2024-02-09 PROCEDURE — 83695 ASSAY OF LIPOPROTEIN(A): CPT | Mod: QW

## 2024-02-09 PROCEDURE — 83036 HEMOGLOBIN GLYCOSYLATED A1C: CPT

## 2024-02-09 PROCEDURE — 36415 COLL VENOUS BLD VENIPUNCTURE: CPT

## 2024-02-09 PROCEDURE — 80053 COMPREHEN METABOLIC PANEL: CPT

## 2024-02-09 PROCEDURE — 80061 LIPID PANEL: CPT | Mod: QW

## 2024-02-09 PROCEDURE — 99396 PREV VISIT EST AGE 40-64: CPT

## 2024-02-09 SDOH — HEALTH STABILITY: PHYSICAL HEALTH: ON AVERAGE, HOW MANY MINUTES DO YOU ENGAGE IN EXERCISE AT THIS LEVEL?: 30 MIN

## 2024-02-09 SDOH — HEALTH STABILITY: PHYSICAL HEALTH: ON AVERAGE, HOW MANY DAYS PER WEEK DO YOU ENGAGE IN MODERATE TO STRENUOUS EXERCISE (LIKE A BRISK WALK)?: 4 DAYS

## 2024-02-09 ASSESSMENT — ANXIETY QUESTIONNAIRES
GAD7 TOTAL SCORE: 0
2. NOT BEING ABLE TO STOP OR CONTROL WORRYING: NOT AT ALL
1. FEELING NERVOUS, ANXIOUS, OR ON EDGE: NOT AT ALL
3. WORRYING TOO MUCH ABOUT DIFFERENT THINGS: NOT AT ALL
5. BEING SO RESTLESS THAT IT IS HARD TO SIT STILL: NOT AT ALL
7. FEELING AFRAID AS IF SOMETHING AWFUL MIGHT HAPPEN: NOT AT ALL
6. BECOMING EASILY ANNOYED OR IRRITABLE: NOT AT ALL
GAD7 TOTAL SCORE: 0

## 2024-02-09 ASSESSMENT — PATIENT HEALTH QUESTIONNAIRE - PHQ9
SUM OF ALL RESPONSES TO PHQ QUESTIONS 1-9: 3
5. POOR APPETITE OR OVEREATING: NOT AT ALL

## 2024-02-09 ASSESSMENT — SOCIAL DETERMINANTS OF HEALTH (SDOH): HOW OFTEN DO YOU GET TOGETHER WITH FRIENDS OR RELATIVES?: ONCE A WEEK

## 2024-02-09 NOTE — PROGRESS NOTES
Preventive Care Visit  [facility]  JESIKA Hart CNP, Nurse Practitioner Primary Care  Feb 9, 2024        Marcelino Nina is a 43 year old, presenting for the following:  Physical (Fasting ) and Recheck Medication (Adderall //Last CSA: 5/22/23 - UTD/Last TOX: 5/22/23 - UTD)         Health Care Directive  Patient does not have a Health Care Directive or Living Will:     HPI              2/9/2024   General Health   How would you rate your overall physical health? Good   Feel stress (tense, anxious, or unable to sleep) To some extent   (!) STRESS CONCERN      2/9/2024   Nutrition   Three or more servings of calcium each day? Yes   Diet: Vegetarian/vegan   How many servings of fruit and vegetables per day? 4 or more   How many sweetened beverages each day? 0-1         2/9/2024   Exercise   Days per week of moderate/strenous exercise 4 days   Average minutes spent exercising at this level 30 min         2/9/2024   Social Factors   Frequency of gathering with friends or relatives Once a week   Worry food won't last until get money to buy more No   Food not last or not have enough money for food? No   Do you have housing?  Yes   Are you worried about losing your housing? No   Lack of transportation? No   Unable to get utilities (heat,electricity)? No         2/9/2024   Dental   Dentist two times every year? (!) NO         2/9/2024   TB Screening   Were you born outside of [place]?  No               2/9/2024   Substance Use   Alcohol more than 3/day or more than 7/wk No   Do you use any other substances recreationally? (!) CANNABIS PRODUCTS     Social History     Tobacco Use    Smoking status: Never    Smokeless tobacco: Never   Vaping Use    Vaping Use: Never used   Substance Use Topics    Alcohol use: Yes     Comment: 1-2 days/week, split a bottle of wine    Drug use: Not Currently           2/9/2024   STI Screening   New sexual partner(s) since last STI/HIV test? (!) YES    The ASCVD Risk score (Adrienne  JESSIKA, et al., 2019) failed to calculate for the following reasons:    The calculator cannot convert the HDL cholesterol units to mg/dL        2/9/2024   Contraception/Family Planning   Questions about contraception or family planning No

## 2024-02-09 NOTE — PROGRESS NOTES
"Preventive Care Visit  [facility]  Kasey JeyJESIKA beatty CNP, Nurse Practitioner Primary Care  Feb 9, 2024      SUBJECTIVE:   Maico is a 43 year old, presenting for the following:  Physical (Fasting ) and Recheck Medication (Adderall //Last CSA: 5/22/23 - UTD/Last TOX: 5/22/23 - UTD)      HPI    ADHD: stable on adderall, no new concerns or symptoms, tolerating well.       Social History     Tobacco Use    Smoking status: Never    Smokeless tobacco: Never   Substance Use Topics    Alcohol use: Yes     Comment: 1-2 days/week, split a bottle of wine             2/9/2024     8:18 AM   Alcohol Use   Prescreen: >3 drinks/day or >7 drinks/week? No          No data to display                Last PSA: No results found for: \"PSA\"    Reviewed orders with patient. Reviewed health maintenance and updated orders accordingly - Yes  Lab work is in process    Reviewed and updated as needed this visit by clinical staff   Tobacco  Allergies  Meds  Problems  Med Hx  Surg Hx  Fam Hx  Soc   Hx        Reviewed and updated as needed this visit by Provider   Tobacco  Allergies  Meds  Problems  Med Hx  Surg Hx  Fam Hx  Soc   Hx Sexual Activity        Past Medical History:   Diagnosis Date    ADHD       Review of Systems    Review of Systems  Constitutional, HEENT, cardiovascular, pulmonary, gi and gu systems are negative, except as otherwise noted.    OBJECTIVE:   /78   Pulse 65   Ht 1.905 m (6' 3\")   Wt 115 kg (253 lb 9.6 oz)   SpO2 98%   BMI 31.70 kg/m     Estimated body mass index is 31.7 kg/m  as calculated from the following:    Height as of this encounter: 1.905 m (6' 3\").    Weight as of this encounter: 115 kg (253 lb 9.6 oz).  Physical Exam  GENERAL: alert and no distress  EYES: Eyes grossly normal to inspection, PERRL and conjunctivae and sclerae normal  HENT: ear canals and TM's normal, nose and mouth without ulcers or lesions  NECK: no adenopathy, no asymmetry, masses, or scars  RESP: lungs " "clear to auscultation - no rales, rhonchi or wheezes  CV: regular rate and rhythm, normal S1 S2, no S3 or S4, no murmur, click or rub, no peripheral edema  ABDOMEN: soft, nontender, no hepatosplenomegaly, no masses and bowel sounds normal  MS: no gross musculoskeletal defects noted, no edema  SKIN: no suspicious lesions or rashes  NEURO: Normal strength and tone, mentation intact and speech normal  PSYCH: mentation appears normal, affect normal/bright    Diagnostic Test Results:  Labs reviewed in Epic  Results for orders placed or performed in visit on 02/09/24 (from the past 24 hour(s))   Lipid Profile ([facility])   Result Value Ref Range    Cholesterol 241 (A) 100 - 199 mg/dL    HDL 24 (A) 40 mg/dL    Triglycerides 106 0 - 149 mg/dL    LDL CALCULATED ([facility]) 195 (A) 0 - 130 mg/dL    Patient Fasting? Yes        ASSESSMENT/PLAN:   Routine general medical examination at a health care facility  -age appropriate preventative care discussed    Hypercholesterolemia  -diet and exercise discussed  - Lipid Profile ([facility])  - VENOUS COLLECTION  - Lipoprotein (a)    Prediabetes  - Hemoglobin A1c    Screening for endocrine, metabolic and immunity disorder  - Comprehensive metabolic panel  - VENOUS COLLECTION  - Comprehensive metabolic panel      Patient has been advised of split billing requirements and indicates understanding: Yes      Counseling  Reviewed preventive health counseling, as reflected in patient instructions       Regular exercise       Healthy diet/nutrition       Colorectal cancer screening       Prostate cancer screening      BMI  Estimated body mass index is 31.7 kg/m  as calculated from the following:    Height as of this encounter: 1.905 m (6' 3\").    Weight as of this encounter: 115 kg (253 lb 9.6 oz).       He reports that he has never smoked. He has never used smokeless tobacco.            Signed Electronically by: JESIKA Hart CNP  "

## 2024-03-06 ENCOUNTER — MYC REFILL (OUTPATIENT)
Dept: FAMILY MEDICINE | Facility: CLINIC | Age: 44
End: 2024-03-06

## 2024-03-06 DIAGNOSIS — F90.0 ATTENTION DEFICIT HYPERACTIVITY DISORDER (ADHD), PREDOMINANTLY INATTENTIVE TYPE: ICD-10-CM

## 2024-03-06 RX ORDER — DEXTROAMPHETAMINE SACCHARATE, AMPHETAMINE ASPARTATE, DEXTROAMPHETAMINE SULFATE AND AMPHETAMINE SULFATE 5; 5; 5; 5 MG/1; MG/1; MG/1; MG/1
20 TABLET ORAL DAILY
Qty: 45 TABLET | Refills: 0 | Status: SHIPPED | OUTPATIENT
Start: 2024-03-06 | End: 2024-04-26

## 2024-03-06 NOTE — TELEPHONE ENCOUNTER
Patient sent refill request for Adderall 20mg #45 si po QD and may take additional 1/2 tab in afternoon if needed.   Last filled: 24 #45    Last related visit: 24 CPE with Kasey Khanna NP     Controlled Substance Agreement:  23  Urine drug screen: 23    [place] per [place] :       Plan: routed to Kasey Khanna NP for review of rx request.  Gerri Marrufo RN

## 2024-04-26 ENCOUNTER — MYC REFILL (OUTPATIENT)
Dept: FAMILY MEDICINE | Facility: CLINIC | Age: 44
End: 2024-04-26

## 2024-04-26 DIAGNOSIS — F90.0 ATTENTION DEFICIT HYPERACTIVITY DISORDER (ADHD), PREDOMINANTLY INATTENTIVE TYPE: ICD-10-CM

## 2024-04-26 NOTE — TELEPHONE ENCOUNTER
Controlled Substance Refill Request for: Adderall IR 20mg #45  Last refill: 3/6/24  Last clinic visit: 2/9/24   Clinic visit frequency required: Q 6  months  Next appt: due for medication check in August 2024  Controlled substance agreement on file: Yes:  Date 5/23/23.  Tox screen done 5/23/23    [place]. Refill routed to Kasey Khanna CNP for review.

## 2024-04-29 RX ORDER — DEXTROAMPHETAMINE SACCHARATE, AMPHETAMINE ASPARTATE, DEXTROAMPHETAMINE SULFATE AND AMPHETAMINE SULFATE 5; 5; 5; 5 MG/1; MG/1; MG/1; MG/1
20 TABLET ORAL DAILY
Qty: 45 TABLET | Refills: 0 | Status: SHIPPED | OUTPATIENT
Start: 2024-04-29 | End: 2024-06-13

## 2024-06-13 ENCOUNTER — MYC REFILL (OUTPATIENT)
Dept: FAMILY MEDICINE | Facility: CLINIC | Age: 44
End: 2024-06-13

## 2024-06-13 DIAGNOSIS — F90.0 ATTENTION DEFICIT HYPERACTIVITY DISORDER (ADHD), PREDOMINANTLY INATTENTIVE TYPE: ICD-10-CM

## 2024-06-13 RX ORDER — DEXTROAMPHETAMINE SACCHARATE, AMPHETAMINE ASPARTATE, DEXTROAMPHETAMINE SULFATE AND AMPHETAMINE SULFATE 5; 5; 5; 5 MG/1; MG/1; MG/1; MG/1
20 TABLET ORAL DAILY
Qty: 45 TABLET | Refills: 0 | Status: SHIPPED | OUTPATIENT
Start: 2024-06-13 | End: 2024-08-15

## 2024-06-13 NOTE — TELEPHONE ENCOUNTER
CONTROLLED SUBSTANCE REFILL REQUEST FOR Adderall    DOSE: 20 mg - # 45  SIG: Take 1 tablet (20 mg) by mouth daily Take additional 0.5 tab (10 mg) in the afternoon if needed. - Oral       LAST REFILL: 4/30/24    LAST APPT RELATED: 5/22/23/ 2/9/24 last cpx     NEXT APPT: No current future appointments scheduled       CONTROLLED SUBSTANCE AGREEMENT: 5/22/23    URINE DRUG SCREEN: 5/22/23        FILL HISTORY PER :          REFILL ROUTED TO MD FOR REVIEW

## 2024-08-15 ENCOUNTER — VIRTUAL VISIT (OUTPATIENT)
Dept: FAMILY MEDICINE | Facility: CLINIC | Age: 44
End: 2024-08-15

## 2024-08-15 DIAGNOSIS — F90.0 ATTENTION DEFICIT HYPERACTIVITY DISORDER (ADHD), PREDOMINANTLY INATTENTIVE TYPE: Primary | ICD-10-CM

## 2024-08-15 DIAGNOSIS — H02.9 EYELID LESION: ICD-10-CM

## 2024-08-15 DIAGNOSIS — Z12.11 SCREENING FOR COLON CANCER: ICD-10-CM

## 2024-08-15 PROCEDURE — G2211 COMPLEX E/M VISIT ADD ON: HCPCS

## 2024-08-15 PROCEDURE — 99214 OFFICE O/P EST MOD 30 MIN: CPT | Mod: 95

## 2024-08-15 RX ORDER — DEXTROAMPHETAMINE SACCHARATE, AMPHETAMINE ASPARTATE, DEXTROAMPHETAMINE SULFATE AND AMPHETAMINE SULFATE 5; 5; 5; 5 MG/1; MG/1; MG/1; MG/1
20 TABLET ORAL DAILY
Qty: 45 TABLET | Refills: 0 | Status: SHIPPED | OUTPATIENT
Start: 2024-10-16 | End: 2024-11-15

## 2024-08-15 RX ORDER — DEXTROAMPHETAMINE SACCHARATE, AMPHETAMINE ASPARTATE, DEXTROAMPHETAMINE SULFATE AND AMPHETAMINE SULFATE 5; 5; 5; 5 MG/1; MG/1; MG/1; MG/1
20 TABLET ORAL DAILY
Qty: 45 TABLET | Refills: 0 | Status: SHIPPED | OUTPATIENT
Start: 2024-09-15 | End: 2024-10-15

## 2024-08-15 RX ORDER — DEXTROAMPHETAMINE SACCHARATE, AMPHETAMINE ASPARTATE, DEXTROAMPHETAMINE SULFATE AND AMPHETAMINE SULFATE 5; 5; 5; 5 MG/1; MG/1; MG/1; MG/1
20 TABLET ORAL DAILY
Qty: 45 TABLET | Refills: 0 | Status: SHIPPED | OUTPATIENT
Start: 2024-08-15 | End: 2024-08-15

## 2024-08-15 NOTE — PROGRESS NOTES
"Maico is a 44 year old who is being evaluated via a billable video visit.    How would you like to obtain your AVS? MyChart  If the video visit is dropped, the invitation should be resent by: Text to cell phone: [phone]  Will anyone else be joining your video visit? No      Assessment & Plan     Attention deficit hyperactivity disorder (ADHD), predominantly inattentive type  - doing well on current dosing, patient to return for in person visit to sign CSA in 6 months  - amphetamine-dextroamphetamine (ADDERALL) 20 MG tablet  Dispense: 45 tablet; Refill: 0  - amphetamine-dextroamphetamine (ADDERALL) 20 MG tablet  Dispense: 45 tablet; Refill: 0    Screening for colon cancer  - Adult GI  Referral - Procedure Only - To a [facility] Location    Eyelid lesion  - Adult Plastic Surgery  Referral - To a [facility] Location            BMI  Estimated body mass index is 31.7 kg/m  as calculated from the following:    Height as of 2/9/24: 1.905 m (6' 3\").    Weight as of 2/9/24: 115 kg (253 lb 9.6 oz).         See Patient Instructions    No follow-ups on file.    Subjective   Maico is a 44 year old, presenting for the following health issues:  Med Check (Routine med check for adderall. Denies any side effects. Needing refills today/Needs CSA at next clinic visit, last signed 05/2023)    HPI     1.) ADHD follow up: needs refill, tolerating well.    Had eyelid growth evaluated at plastics and was advised to have a biopsy in 2019 and never did due to COVID19, would like another referral so he can complete biopsy       Review of Systems  Constitutional, HEENT, cardiovascular, pulmonary, gi and gu systems are negative, except as otherwise noted.      Objective       Vitals:  No vitals were obtained today due to virtual visit.    Physical Exam   GENERAL: alert and no distress  EYES: Eyes grossly normal to inspection.  No discharge or erythema, or obvious scleral/conjunctival abnormalities.  RESP: " No audible wheeze, cough, or visible cyanosis.    SKIN: Visible skin clear. No significant rash, abnormal pigmentation or lesions.  NEURO: Cranial nerves grossly intact.  Mentation and speech appropriate for age.  PSYCH: Appropriate affect, tone, and pace of words    No results found for this or any previous visit (from the past 24 hour(s)).      Video-Visit Details    Type of service:  Video Visit   Originating Location (pt. Location): Home    Distant Location (provider location):  On-site  Platform used for Video Visit: [employer]  Signed Electronically by: JESIKA Hart CNP      The longitudinal plan of care for the diagnosis(es)/condition(s) as documented were addressed during this visit. Due to the added complexity in care, I will continue to support Maico in the subsequent management and with ongoing continuity of care.

## 2024-08-20 DIAGNOSIS — F90.0 ATTENTION DEFICIT HYPERACTIVITY DISORDER (ADHD), PREDOMINANTLY INATTENTIVE TYPE: Primary | ICD-10-CM

## 2024-08-20 RX ORDER — DEXTROAMPHETAMINE SACCHARATE, AMPHETAMINE ASPARTATE, DEXTROAMPHETAMINE SULFATE AND AMPHETAMINE SULFATE 5; 5; 5; 5 MG/1; MG/1; MG/1; MG/1
TABLET ORAL
Qty: 45 TABLET | Refills: 0 | Status: SHIPPED | OUTPATIENT
Start: 2024-08-20

## 2024-08-20 NOTE — TELEPHONE ENCOUNTER
Patient calling to report pharmacy does not have the August Rx on file.  Asking for a new one.    Adderall    [facility]

## 2024-10-07 ENCOUNTER — MYC REFILL (OUTPATIENT)
Dept: FAMILY MEDICINE | Facility: CLINIC | Age: 44
End: 2024-10-07

## 2024-10-07 DIAGNOSIS — F90.0 ATTENTION DEFICIT HYPERACTIVITY DISORDER (ADHD), PREDOMINANTLY INATTENTIVE TYPE: ICD-10-CM

## 2024-10-07 RX ORDER — DEXTROAMPHETAMINE SACCHARATE, AMPHETAMINE ASPARTATE, DEXTROAMPHETAMINE SULFATE AND AMPHETAMINE SULFATE 5; 5; 5; 5 MG/1; MG/1; MG/1; MG/1
TABLET ORAL
Qty: 45 TABLET | Refills: 0 | Status: CANCELLED | OUTPATIENT
Start: 2024-10-07

## 2024-10-09 ENCOUNTER — MYC REFILL (OUTPATIENT)
Dept: FAMILY MEDICINE | Facility: CLINIC | Age: 44
End: 2024-10-09

## 2024-10-09 DIAGNOSIS — F90.0 ATTENTION DEFICIT HYPERACTIVITY DISORDER (ADHD), PREDOMINANTLY INATTENTIVE TYPE: ICD-10-CM

## 2024-10-09 NOTE — TELEPHONE ENCOUNTER
See [employer] message requesting Adderall pharmacy transfer.   Rx queued up and routed to Kasey Khanna NP to send.  Gerri Marrufo RN    
no

## 2024-10-10 RX ORDER — DEXTROAMPHETAMINE SACCHARATE, AMPHETAMINE ASPARTATE, DEXTROAMPHETAMINE SULFATE AND AMPHETAMINE SULFATE 5; 5; 5; 5 MG/1; MG/1; MG/1; MG/1
20 TABLET ORAL DAILY
Qty: 45 TABLET | Refills: 0 | Status: SHIPPED | OUTPATIENT
Start: 2024-10-10

## 2024-12-11 ENCOUNTER — MYC REFILL (OUTPATIENT)
Dept: FAMILY MEDICINE | Facility: CLINIC | Age: 44
End: 2024-12-11

## 2024-12-11 DIAGNOSIS — F90.0 ATTENTION DEFICIT HYPERACTIVITY DISORDER (ADHD), PREDOMINANTLY INATTENTIVE TYPE: ICD-10-CM

## 2024-12-12 RX ORDER — DEXTROAMPHETAMINE SACCHARATE, AMPHETAMINE ASPARTATE, DEXTROAMPHETAMINE SULFATE AND AMPHETAMINE SULFATE 5; 5; 5; 5 MG/1; MG/1; MG/1; MG/1
TABLET ORAL
Qty: 45 TABLET | Refills: 0 | Status: SHIPPED | OUTPATIENT
Start: 2024-12-12

## 2024-12-12 NOTE — TELEPHONE ENCOUNTER
Patient requesting refill of amphetamine-dextroamphetamine (Adderall) 20mg si po q AM. Take additional 1/2 tab in afternoon if needed     Last related visit: 8/15/24 med check with Kasey Khanna NP; CPE 24 with Kasey     Next visit due: Feb for 6 month med check and annual CPE - no appts scheduled.    Controlled Substance Agreement: Due    [place] :         Plan: routed refill request to Kasey Khanna NP for review. Messaged patient this was done and he is due for CPE/med check in February.     Gerri Marrufo RN

## 2025-02-19 ENCOUNTER — MYC REFILL (OUTPATIENT)
Dept: FAMILY MEDICINE | Facility: CLINIC | Age: 45
End: 2025-02-19

## 2025-02-19 DIAGNOSIS — F90.0 ATTENTION DEFICIT HYPERACTIVITY DISORDER (ADHD), PREDOMINANTLY INATTENTIVE TYPE: ICD-10-CM

## 2025-02-21 NOTE — CONFIDENTIAL NOTE
CONTROLLED SUBSTANCE REFILL REQUEST FOR ADDERALL  DOSE: 20 MG - # 45  SI TAB DAILY. TAKE ADDITIONAL 0.5 TABS IN AFTERNOON PRN      LAST REFILL: 24    LAST APPT RELATED: 8/15/24 - VV, MEDS    NEXT APPT: NONE  *DUE 2025 FOR MED CHECK & CPX  ~ [facility] MESSAGE SENT THAT PATIENT IS DUE FOR APPT        CONTROLLED SUBSTANCE AGREEMENT: DUE    URINE DRUG SCREEN: DUE        FILL HISTORY PER :          REFILL ROUTED TO CARLOS AMIN FOR REVIEW    Beatrice Diaz, CMA

## 2025-02-23 RX ORDER — DEXTROAMPHETAMINE SACCHARATE, AMPHETAMINE ASPARTATE, DEXTROAMPHETAMINE SULFATE AND AMPHETAMINE SULFATE 5; 5; 5; 5 MG/1; MG/1; MG/1; MG/1
TABLET ORAL
Qty: 45 TABLET | Refills: 0 | Status: SHIPPED | OUTPATIENT
Start: 2025-02-23

## 2025-03-09 ENCOUNTER — HEALTH MAINTENANCE LETTER (OUTPATIENT)
Age: 45
End: 2025-03-09

## 2025-07-01 ENCOUNTER — MYC REFILL (OUTPATIENT)
Dept: FAMILY MEDICINE | Facility: CLINIC | Age: 45
End: 2025-07-01

## 2025-07-01 DIAGNOSIS — F90.0 ATTENTION DEFICIT HYPERACTIVITY DISORDER (ADHD), PREDOMINANTLY INATTENTIVE TYPE: ICD-10-CM

## 2025-07-01 RX ORDER — DEXTROAMPHETAMINE SACCHARATE, AMPHETAMINE ASPARTATE, DEXTROAMPHETAMINE SULFATE AND AMPHETAMINE SULFATE 5; 5; 5; 5 MG/1; MG/1; MG/1; MG/1
20 TABLET ORAL DAILY
Qty: 30 TABLET | Refills: 0 | Status: CANCELLED | OUTPATIENT
Start: 2025-07-01

## 2025-07-02 NOTE — CONFIDENTIAL NOTE
3 MONTH SUPPLY FOR 6/10, 7/10, 8/9 SENT 5/16/25    PT HAS REFILLS AT THE PHARMACY - PT INFORMED

## 2025-07-08 ENCOUNTER — MYC REFILL (OUTPATIENT)
Dept: FAMILY MEDICINE | Facility: CLINIC | Age: 45
End: 2025-07-08

## 2025-07-08 DIAGNOSIS — F90.0 ATTENTION DEFICIT HYPERACTIVITY DISORDER (ADHD), PREDOMINANTLY INATTENTIVE TYPE: ICD-10-CM

## 2025-07-08 RX ORDER — DEXTROAMPHETAMINE SACCHARATE, AMPHETAMINE ASPARTATE, DEXTROAMPHETAMINE SULFATE AND AMPHETAMINE SULFATE 5; 5; 5; 5 MG/1; MG/1; MG/1; MG/1
20 TABLET ORAL DAILY
Qty: 30 TABLET | Refills: 0 | Status: CANCELLED | OUTPATIENT
Start: 2025-07-08